# Patient Record
Sex: MALE | Race: WHITE | NOT HISPANIC OR LATINO | Employment: FULL TIME | URBAN - METROPOLITAN AREA
[De-identification: names, ages, dates, MRNs, and addresses within clinical notes are randomized per-mention and may not be internally consistent; named-entity substitution may affect disease eponyms.]

---

## 2017-01-05 ENCOUNTER — OFFICE VISIT - HEALTHEAST (OUTPATIENT)
Dept: FAMILY MEDICINE | Facility: CLINIC | Age: 57
End: 2017-01-05

## 2017-01-05 DIAGNOSIS — S46.012A STRAIN OF MUSCLE(S) AND TENDON(S) OF THE ROTATOR CUFF OF LEFT SHOULDER, INITIAL ENCOUNTER: ICD-10-CM

## 2017-01-05 DIAGNOSIS — I10 ESSENTIAL HYPERTENSION WITH GOAL BLOOD PRESSURE LESS THAN 130/80: ICD-10-CM

## 2017-01-05 DIAGNOSIS — R07.89 MUSCULOSKELETAL CHEST PAIN: ICD-10-CM

## 2017-05-29 ENCOUNTER — COMMUNICATION - HEALTHEAST (OUTPATIENT)
Dept: FAMILY MEDICINE | Facility: CLINIC | Age: 57
End: 2017-05-29

## 2017-05-29 DIAGNOSIS — F41.1 ANXIETY STATE: ICD-10-CM

## 2017-08-15 ENCOUNTER — COMMUNICATION - HEALTHEAST (OUTPATIENT)
Dept: FAMILY MEDICINE | Facility: CLINIC | Age: 57
End: 2017-08-15

## 2017-08-15 DIAGNOSIS — I10 ESSENTIAL HYPERTENSION WITH GOAL BLOOD PRESSURE LESS THAN 130/80: ICD-10-CM

## 2017-08-22 ENCOUNTER — OFFICE VISIT - HEALTHEAST (OUTPATIENT)
Dept: FAMILY MEDICINE | Facility: CLINIC | Age: 57
End: 2017-08-22

## 2017-08-22 DIAGNOSIS — R73.01 IMPAIRED FASTING GLUCOSE: ICD-10-CM

## 2017-08-22 DIAGNOSIS — I10 ESSENTIAL HYPERTENSION WITH GOAL BLOOD PRESSURE LESS THAN 130/80: ICD-10-CM

## 2017-08-22 DIAGNOSIS — E78.5 HYPERLIPIDEMIA: ICD-10-CM

## 2017-08-22 DIAGNOSIS — F41.1 ANXIETY STATE: ICD-10-CM

## 2017-08-22 DIAGNOSIS — M79.651 RIGHT THIGH PAIN: ICD-10-CM

## 2017-08-22 LAB
CHOLEST SERPL-MCNC: 227 MG/DL
FASTING STATUS PATIENT QL REPORTED: NO
HBA1C MFR BLD: 6.3 % (ref 3.5–6)
HDLC SERPL-MCNC: 32 MG/DL
LDLC SERPL CALC-MCNC: 112 MG/DL

## 2017-08-23 ENCOUNTER — COMMUNICATION - HEALTHEAST (OUTPATIENT)
Dept: FAMILY MEDICINE | Facility: CLINIC | Age: 57
End: 2017-08-23

## 2017-08-27 ENCOUNTER — COMMUNICATION - HEALTHEAST (OUTPATIENT)
Dept: FAMILY MEDICINE | Facility: CLINIC | Age: 57
End: 2017-08-27

## 2017-08-27 DIAGNOSIS — F41.1 ANXIETY STATE: ICD-10-CM

## 2017-09-27 ENCOUNTER — OFFICE VISIT - HEALTHEAST (OUTPATIENT)
Dept: FAMILY MEDICINE | Facility: CLINIC | Age: 57
End: 2017-09-27

## 2017-09-27 DIAGNOSIS — L91.8 MULTIPLE ACQUIRED SKIN TAGS: ICD-10-CM

## 2017-09-27 DIAGNOSIS — L98.9 SKIN LESION: ICD-10-CM

## 2017-09-27 DIAGNOSIS — D23.9 PAPILLOMA OF SKIN: ICD-10-CM

## 2018-01-09 ENCOUNTER — COMMUNICATION - HEALTHEAST (OUTPATIENT)
Dept: FAMILY MEDICINE | Facility: CLINIC | Age: 58
End: 2018-01-09

## 2018-01-09 DIAGNOSIS — F41.1 ANXIETY STATE: ICD-10-CM

## 2018-03-11 ENCOUNTER — COMMUNICATION - HEALTHEAST (OUTPATIENT)
Dept: FAMILY MEDICINE | Facility: CLINIC | Age: 58
End: 2018-03-11

## 2018-03-11 DIAGNOSIS — I10 ESSENTIAL HYPERTENSION WITH GOAL BLOOD PRESSURE LESS THAN 130/80: ICD-10-CM

## 2018-05-02 ENCOUNTER — COMMUNICATION - HEALTHEAST (OUTPATIENT)
Dept: FAMILY MEDICINE | Facility: CLINIC | Age: 58
End: 2018-05-02

## 2018-05-02 DIAGNOSIS — F41.1 ANXIETY STATE: ICD-10-CM

## 2018-06-16 ENCOUNTER — COMMUNICATION - HEALTHEAST (OUTPATIENT)
Dept: FAMILY MEDICINE | Facility: CLINIC | Age: 58
End: 2018-06-16

## 2018-06-16 DIAGNOSIS — F41.1 ANXIETY STATE: ICD-10-CM

## 2018-07-13 ENCOUNTER — COMMUNICATION - HEALTHEAST (OUTPATIENT)
Dept: FAMILY MEDICINE | Facility: CLINIC | Age: 58
End: 2018-07-13

## 2018-07-13 DIAGNOSIS — I10 ESSENTIAL HYPERTENSION WITH GOAL BLOOD PRESSURE LESS THAN 130/80: ICD-10-CM

## 2018-08-06 ENCOUNTER — COMMUNICATION - HEALTHEAST (OUTPATIENT)
Dept: FAMILY MEDICINE | Facility: CLINIC | Age: 58
End: 2018-08-06

## 2018-08-06 DIAGNOSIS — F41.1 ANXIETY STATE: ICD-10-CM

## 2018-10-08 ENCOUNTER — COMMUNICATION - HEALTHEAST (OUTPATIENT)
Dept: FAMILY MEDICINE | Facility: CLINIC | Age: 58
End: 2018-10-08

## 2018-10-08 DIAGNOSIS — F41.1 ANXIETY STATE: ICD-10-CM

## 2018-10-23 ENCOUNTER — COMMUNICATION - HEALTHEAST (OUTPATIENT)
Dept: FAMILY MEDICINE | Facility: CLINIC | Age: 58
End: 2018-10-23

## 2018-10-23 DIAGNOSIS — F41.1 ANXIETY STATE: ICD-10-CM

## 2018-10-24 RX ORDER — LORAZEPAM 1 MG/1
TABLET ORAL
Qty: 30 TABLET | Refills: 0 | Status: SHIPPED | OUTPATIENT
Start: 2018-10-24 | End: 2022-05-31

## 2018-11-23 ENCOUNTER — OFFICE VISIT - HEALTHEAST (OUTPATIENT)
Dept: FAMILY MEDICINE | Facility: CLINIC | Age: 58
End: 2018-11-23

## 2018-11-23 ENCOUNTER — COMMUNICATION - HEALTHEAST (OUTPATIENT)
Dept: TELEHEALTH | Facility: CLINIC | Age: 58
End: 2018-11-23

## 2018-11-23 DIAGNOSIS — I10 ESSENTIAL HYPERTENSION: ICD-10-CM

## 2018-11-23 DIAGNOSIS — F41.1 ANXIETY STATE: ICD-10-CM

## 2018-11-23 DIAGNOSIS — K21.9 GASTROESOPHAGEAL REFLUX DISEASE, ESOPHAGITIS PRESENCE NOT SPECIFIED: ICD-10-CM

## 2018-11-23 DIAGNOSIS — Z00.00 ENCOUNTER FOR ANNUAL GENERAL MEDICAL EXAMINATION WITHOUT ABNORMAL FINDINGS IN ADULT: ICD-10-CM

## 2018-11-23 DIAGNOSIS — E66.811 CLASS 1 OBESITY DUE TO EXCESS CALORIES WITH BODY MASS INDEX (BMI) OF 34.0 TO 34.9 IN ADULT, UNSPECIFIED WHETHER SERIOUS COMORBIDITY PRESENT: ICD-10-CM

## 2018-11-23 DIAGNOSIS — R73.01 IMPAIRED FASTING GLUCOSE: ICD-10-CM

## 2018-11-23 DIAGNOSIS — E66.09 CLASS 1 OBESITY DUE TO EXCESS CALORIES WITH BODY MASS INDEX (BMI) OF 34.0 TO 34.9 IN ADULT, UNSPECIFIED WHETHER SERIOUS COMORBIDITY PRESENT: ICD-10-CM

## 2018-11-23 DIAGNOSIS — E78.2 MIXED HYPERLIPIDEMIA: ICD-10-CM

## 2018-11-23 LAB
ALBUMIN SERPL-MCNC: 4 G/DL (ref 3.5–5)
ALP SERPL-CCNC: 52 U/L (ref 45–120)
ALT SERPL W P-5'-P-CCNC: 27 U/L (ref 0–45)
ANION GAP SERPL CALCULATED.3IONS-SCNC: 10 MMOL/L (ref 5–18)
AST SERPL W P-5'-P-CCNC: 24 U/L (ref 0–40)
BILIRUB SERPL-MCNC: 1 MG/DL (ref 0–1)
BUN SERPL-MCNC: 20 MG/DL (ref 8–22)
CALCIUM SERPL-MCNC: 10.2 MG/DL (ref 8.5–10.5)
CHLORIDE BLD-SCNC: 109 MMOL/L (ref 98–107)
CHOLEST SERPL-MCNC: 248 MG/DL
CO2 SERPL-SCNC: 20 MMOL/L (ref 22–31)
CREAT SERPL-MCNC: 0.89 MG/DL (ref 0.7–1.3)
FASTING STATUS PATIENT QL REPORTED: YES
GFR SERPL CREATININE-BSD FRML MDRD: >60 ML/MIN/1.73M2
GLUCOSE BLD-MCNC: 117 MG/DL (ref 70–125)
HBA1C MFR BLD: 6.2 % (ref 3.5–6)
HDLC SERPL-MCNC: 39 MG/DL
LDLC SERPL CALC-MCNC: 157 MG/DL
POTASSIUM BLD-SCNC: 4.7 MMOL/L (ref 3.5–5)
PROT SERPL-MCNC: 7.1 G/DL (ref 6–8)
SODIUM SERPL-SCNC: 139 MMOL/L (ref 136–145)
TRIGL SERPL-MCNC: 260 MG/DL

## 2018-11-23 ASSESSMENT — MIFFLIN-ST. JEOR: SCORE: 1996.05

## 2018-11-27 ENCOUNTER — COMMUNICATION - HEALTHEAST (OUTPATIENT)
Dept: FAMILY MEDICINE | Facility: CLINIC | Age: 58
End: 2018-11-27

## 2018-12-05 ENCOUNTER — COMMUNICATION - HEALTHEAST (OUTPATIENT)
Dept: FAMILY MEDICINE | Facility: CLINIC | Age: 58
End: 2018-12-05

## 2018-12-05 ENCOUNTER — COMMUNICATION - HEALTHEAST (OUTPATIENT)
Dept: SCHEDULING | Facility: CLINIC | Age: 58
End: 2018-12-05

## 2019-01-10 ENCOUNTER — COMMUNICATION - HEALTHEAST (OUTPATIENT)
Dept: FAMILY MEDICINE | Facility: CLINIC | Age: 59
End: 2019-01-10

## 2019-01-10 DIAGNOSIS — F41.1 ANXIETY STATE: ICD-10-CM

## 2019-03-18 ENCOUNTER — COMMUNICATION - HEALTHEAST (OUTPATIENT)
Dept: FAMILY MEDICINE | Facility: CLINIC | Age: 59
End: 2019-03-18

## 2019-03-18 DIAGNOSIS — I10 ESSENTIAL HYPERTENSION: ICD-10-CM

## 2019-04-05 ENCOUNTER — RECORDS - HEALTHEAST (OUTPATIENT)
Dept: ADMINISTRATIVE | Facility: OTHER | Age: 59
End: 2019-04-05

## 2019-07-14 ENCOUNTER — RECORDS - HEALTHEAST (OUTPATIENT)
Dept: ADMINISTRATIVE | Facility: OTHER | Age: 59
End: 2019-07-14

## 2019-07-17 ENCOUNTER — COMMUNICATION - HEALTHEAST (OUTPATIENT)
Dept: FAMILY MEDICINE | Facility: CLINIC | Age: 59
End: 2019-07-17

## 2019-07-17 DIAGNOSIS — I10 ESSENTIAL HYPERTENSION: ICD-10-CM

## 2019-11-25 ENCOUNTER — COMMUNICATION - HEALTHEAST (OUTPATIENT)
Dept: FAMILY MEDICINE | Facility: CLINIC | Age: 59
End: 2019-11-25

## 2019-11-25 DIAGNOSIS — F41.1 ANXIETY STATE: ICD-10-CM

## 2019-12-02 ENCOUNTER — COMMUNICATION - HEALTHEAST (OUTPATIENT)
Dept: FAMILY MEDICINE | Facility: CLINIC | Age: 59
End: 2019-12-02

## 2019-12-02 DIAGNOSIS — I10 ESSENTIAL HYPERTENSION: ICD-10-CM

## 2019-12-04 ENCOUNTER — COMMUNICATION - HEALTHEAST (OUTPATIENT)
Dept: FAMILY MEDICINE | Facility: CLINIC | Age: 59
End: 2019-12-04

## 2019-12-04 DIAGNOSIS — E78.2 MIXED HYPERLIPIDEMIA: ICD-10-CM

## 2020-01-09 ENCOUNTER — COMMUNICATION - HEALTHEAST (OUTPATIENT)
Dept: FAMILY MEDICINE | Facility: CLINIC | Age: 60
End: 2020-01-09

## 2020-01-09 DIAGNOSIS — F41.1 ANXIETY STATE: ICD-10-CM

## 2020-04-13 ENCOUNTER — COMMUNICATION - HEALTHEAST (OUTPATIENT)
Dept: FAMILY MEDICINE | Facility: CLINIC | Age: 60
End: 2020-04-13

## 2020-04-13 DIAGNOSIS — F41.1 ANXIETY STATE: ICD-10-CM

## 2020-04-14 ENCOUNTER — OFFICE VISIT - HEALTHEAST (OUTPATIENT)
Dept: FAMILY MEDICINE | Facility: CLINIC | Age: 60
End: 2020-04-14

## 2020-04-14 DIAGNOSIS — E66.811 CLASS 1 OBESITY DUE TO EXCESS CALORIES WITH BODY MASS INDEX (BMI) OF 34.0 TO 34.9 IN ADULT, UNSPECIFIED WHETHER SERIOUS COMORBIDITY PRESENT: ICD-10-CM

## 2020-04-14 DIAGNOSIS — R73.01 IMPAIRED FASTING GLUCOSE: ICD-10-CM

## 2020-04-14 DIAGNOSIS — K21.9 GASTROESOPHAGEAL REFLUX DISEASE, ESOPHAGITIS PRESENCE NOT SPECIFIED: ICD-10-CM

## 2020-04-14 DIAGNOSIS — E78.2 MIXED HYPERLIPIDEMIA: ICD-10-CM

## 2020-04-14 DIAGNOSIS — I10 ESSENTIAL HYPERTENSION: ICD-10-CM

## 2020-04-14 DIAGNOSIS — E66.09 CLASS 1 OBESITY DUE TO EXCESS CALORIES WITH BODY MASS INDEX (BMI) OF 34.0 TO 34.9 IN ADULT, UNSPECIFIED WHETHER SERIOUS COMORBIDITY PRESENT: ICD-10-CM

## 2020-04-14 DIAGNOSIS — T78.40XA ALLERGIC STATE, INITIAL ENCOUNTER: ICD-10-CM

## 2020-04-14 DIAGNOSIS — F41.1 ANXIETY STATE: ICD-10-CM

## 2020-04-14 ASSESSMENT — ANXIETY QUESTIONNAIRES
4. TROUBLE RELAXING: MORE THAN HALF THE DAYS
2. NOT BEING ABLE TO STOP OR CONTROL WORRYING: SEVERAL DAYS
1. FEELING NERVOUS, ANXIOUS, OR ON EDGE: MORE THAN HALF THE DAYS
3. WORRYING TOO MUCH ABOUT DIFFERENT THINGS: MORE THAN HALF THE DAYS
IF YOU CHECKED OFF ANY PROBLEMS ON THIS QUESTIONNAIRE, HOW DIFFICULT HAVE THESE PROBLEMS MADE IT FOR YOU TO DO YOUR WORK, TAKE CARE OF THINGS AT HOME, OR GET ALONG WITH OTHER PEOPLE: SOMEWHAT DIFFICULT
5. BEING SO RESTLESS THAT IT IS HARD TO SIT STILL: NOT AT ALL
GAD7 TOTAL SCORE: 10
6. BECOMING EASILY ANNOYED OR IRRITABLE: MORE THAN HALF THE DAYS
7. FEELING AFRAID AS IF SOMETHING AWFUL MIGHT HAPPEN: SEVERAL DAYS

## 2020-04-14 ASSESSMENT — PATIENT HEALTH QUESTIONNAIRE - PHQ9: SUM OF ALL RESPONSES TO PHQ QUESTIONS 1-9: 6

## 2020-04-22 ENCOUNTER — COMMUNICATION - HEALTHEAST (OUTPATIENT)
Dept: SCHEDULING | Facility: CLINIC | Age: 60
End: 2020-04-22

## 2020-10-10 ENCOUNTER — COMMUNICATION - HEALTHEAST (OUTPATIENT)
Dept: FAMILY MEDICINE | Facility: CLINIC | Age: 60
End: 2020-10-10

## 2020-10-10 DIAGNOSIS — I10 ESSENTIAL HYPERTENSION: ICD-10-CM

## 2021-01-07 ENCOUNTER — OFFICE VISIT - HEALTHEAST (OUTPATIENT)
Dept: FAMILY MEDICINE | Facility: CLINIC | Age: 61
End: 2021-01-07

## 2021-01-07 DIAGNOSIS — S06.0X0D CONCUSSION WITHOUT LOSS OF CONSCIOUSNESS, SUBSEQUENT ENCOUNTER: ICD-10-CM

## 2021-01-07 DIAGNOSIS — R51.9 NONINTRACTABLE HEADACHE, UNSPECIFIED CHRONICITY PATTERN, UNSPECIFIED HEADACHE TYPE: ICD-10-CM

## 2021-01-07 DIAGNOSIS — G47.00 INSOMNIA, UNSPECIFIED TYPE: ICD-10-CM

## 2021-04-12 ENCOUNTER — COMMUNICATION - HEALTHEAST (OUTPATIENT)
Dept: FAMILY MEDICINE | Facility: CLINIC | Age: 61
End: 2021-04-12

## 2021-04-12 DIAGNOSIS — F41.1 ANXIETY STATE: ICD-10-CM

## 2021-04-12 DIAGNOSIS — I10 ESSENTIAL HYPERTENSION: ICD-10-CM

## 2021-04-13 RX ORDER — PAROXETINE 20 MG/1
TABLET, FILM COATED ORAL
Qty: 270 TABLET | Refills: 3 | Status: SHIPPED | OUTPATIENT
Start: 2021-04-13 | End: 2022-05-31

## 2021-04-13 RX ORDER — LISINOPRIL 10 MG/1
TABLET ORAL
Qty: 90 TABLET | Refills: 1 | Status: SHIPPED | OUTPATIENT
Start: 2021-04-13 | End: 2021-11-14

## 2021-05-26 ASSESSMENT — PATIENT HEALTH QUESTIONNAIRE - PHQ9: SUM OF ALL RESPONSES TO PHQ QUESTIONS 1-9: 6

## 2021-05-28 ASSESSMENT — ANXIETY QUESTIONNAIRES: GAD7 TOTAL SCORE: 10

## 2021-05-30 VITALS — WEIGHT: 250 LBS | BODY MASS INDEX: 33.67 KG/M2

## 2021-05-30 NOTE — TELEPHONE ENCOUNTER
Refill Approved    Rx renewed per Medication Renewal Policy. Medication was last renewed on 3/18/19.    Joelle Wood, [facility] Triage/Med Refill 7/17/2019     Requested Prescriptions   Pending Prescriptions Disp Refills     lisinopril (PRINIVIL,ZESTRIL) 10 MG tablet [Pharmacy Med Name: LISINOPRIL 10MG TABLETS] 90 tablet 0     Sig: TAKE 1 TABLET(10 MG) BY MOUTH DAILY AS DIRECTED       Ace Inhibitors Refill Protocol Passed - 7/17/2019  4:27 PM        Passed - PCP or prescribing provider visit in past 12 months       Last office visit with prescriber/PCP: 9/27/2017 Oneil Stacy MD OR same dept: Visit date not found OR same specialty: 9/27/2017 Oneil Stacy MD  Last physical: 11/4/2015 Last MTM visit: Visit date not found   Next visit within 3 mo: Visit date not found  Next physical within 3 mo: Visit date not found  Prescriber OR PCP: Oneil Stacy MD  Last diagnosis associated with med order: 1. Essential hypertension  - lisinopril (PRINIVIL,ZESTRIL) 10 MG tablet [Pharmacy Med Name: LISINOPRIL 10MG TABLETS]; TAKE 1 TABLET(10 MG) BY MOUTH DAILY AS DIRECTED  Dispense: 90 tablet; Refill: 0    If protocol passes may refill for 12 months if within 3 months of last provider visit (or a total of 15 months).             Passed - Serum Potassium in past 12 months     Lab Results   Component Value Date    Potassium 4.7 11/23/2018             Passed - Blood pressure filed in past 12 months     BP Readings from Last 1 Encounters:   11/23/18 122/80             Passed - Serum Creatinine in past 12 months     Creatinine   Date Value Ref Range Status   11/23/2018 0.89 0.70 - 1.30 mg/dL Final

## 2021-05-31 VITALS — WEIGHT: 244 LBS | BODY MASS INDEX: 32.86 KG/M2

## 2021-05-31 VITALS — WEIGHT: 242 LBS | BODY MASS INDEX: 32.59 KG/M2

## 2021-06-02 VITALS — HEIGHT: 72 IN | WEIGHT: 253.1 LBS | BODY MASS INDEX: 34.28 KG/M2

## 2021-06-03 NOTE — TELEPHONE ENCOUNTER
Refill NOT Given  Refill NOT Given> 15 months since last office visit  Refill given per Policy, patient informed they are overdue for Labs and Office Visit   OV 9/27/17 & 11/23/18    Tina Gilliland, [facility] Connection Triage/Med Refill 12/2/2019    Requested Prescriptions   Pending Prescriptions Disp Refills     lisinopril (PRINIVIL,ZESTRIL) 10 MG tablet [Pharmacy Med Name: LISINOPRIL 10MG TABLETS] 90 tablet 0     Sig: TAKE 1 TABLET(10 MG) BY MOUTH DAILY AS DIRECTED       Ace Inhibitors Refill Protocol Failed - 12/2/2019  9:44 AM        Failed - PCP or prescribing provider visit in past 12 months       Last office visit with prescriber/PCP: 9/27/2017 Oneil Stacy MD OR same dept: Visit date not found OR same specialty: 9/27/2017 Oneil Stacy MD  Last physical: 11/4/2015 Last MTM visit: Visit date not found   Next visit within 3 mo: Visit date not found  Next physical within 3 mo: Visit date not found  Prescriber OR PCP: Oneil Stacy MD  Last diagnosis associated with med order: 1. Essential hypertension  - lisinopril (PRINIVIL,ZESTRIL) 10 MG tablet [Pharmacy Med Name: LISINOPRIL 10MG TABLETS]; TAKE 1 TABLET(10 MG) BY MOUTH DAILY AS DIRECTED  Dispense: 90 tablet; Refill: 0    If protocol passes may refill for 12 months if within 3 months of last provider visit (or a total of 15 months).             Failed - Serum Potassium in past 12 months     No results found for: LN-POTASSIUM          Failed - Blood pressure filed in past 12 months     BP Readings from Last 1 Encounters:   11/23/18 122/80             Failed - Serum Creatinine in past 12 months     Creatinine   Date Value Ref Range Status   11/23/2018 0.89 0.70 - 1.30 mg/dL Final

## 2021-06-03 NOTE — TELEPHONE ENCOUNTER
RN cannot approve Refill Request    RN can NOT refill this medication PCP messaged that patient is overdue for Office Visit. Last office visit: 9/27/2017 Oneil Stacy MD Last Physical: 11/4/2015 Last MTM visit: Visit date not found Last visit same specialty: 9/27/2017 Oneil Stacy MD.  Next visit within 3 mo: Visit date not found  Next physical within 3 mo: Visit date not found      Fanny Mondragon, [facility] Triage/Med Refill 11/26/2019    Requested Prescriptions   Pending Prescriptions Disp Refills     PARoxetine (PAXIL) 20 MG tablet [Pharmacy Med Name: PAROXETINE 20MG TABLETS] 270 tablet 0     Sig: TAKE 3 TABLETS BY MOUTH EVERY DAY       [facility] Refill Protocol  Failed - 11/25/2019  8:36 AM        Failed - PCP or prescribing provider visit in last year     Last office visit with prescriber/PCP: 9/27/2017 Oneil Stacy MD OR same dept: Visit date not found OR same specialty: 9/27/2017 Oneil Stacy MD  Last physical: 11/4/2015 Last MTM visit: Visit date not found   Next visit within 3 mo: Visit date not found  Next physical within 3 mo: Visit date not found  Prescriber OR PCP: Oneil Stacy MD  Last diagnosis associated with med order: 1. Anxiety state  - PARoxetine (PAXIL) 20 MG tablet [Pharmacy Med Name: PAROXETINE 20MG TABLETS]; TAKE 3 TABLETS BY MOUTH EVERY DAY  Dispense: 270 tablet; Refill: 0    If protocol passes may refill for 12 months if within 3 months of last provider visit (or a total of 15 months).

## 2021-06-04 NOTE — TELEPHONE ENCOUNTER
RN cannot approve Refill Request    RN can NOT refill this medication Protocol failed and NO refill given.       Joelle Wood, [facility] Triage/Med Refill 12/5/2019    Requested Prescriptions   Pending Prescriptions Disp Refills     atorvastatin (LIPITOR) 20 MG tablet 30 tablet 11     Sig: Take 1 tablet (20 mg total) by mouth daily.       Statins Refill Protocol (Hmg CoA Reductase Inhibitors) Failed - 12/4/2019 11:32 PM        Failed - PCP or prescribing provider visit in past 12 months      Last office visit with prescriber/PCP: 9/27/2017 Oneil Stacy MD OR same dept: Visit date not found OR same specialty: 9/27/2017 Oneil Stacy MD  Last physical: 11/4/2015 Last MTM visit: Visit date not found   Next visit within 3 mo: Visit date not found  Next physical within 3 mo: Visit date not found  Prescriber OR PCP: Oneil Stacy MD  Last diagnosis associated with med order: 1. Mixed hyperlipidemia  - atorvastatin (LIPITOR) 20 MG tablet; Take 1 tablet (20 mg total) by mouth daily.  Dispense: 30 tablet; Refill: 11    If protocol passes may refill for 12 months if within 3 months of last provider visit (or a total of 15 months).

## 2021-06-05 VITALS
SYSTOLIC BLOOD PRESSURE: 130 MMHG | TEMPERATURE: 98.8 F | BODY MASS INDEX: 33.63 KG/M2 | DIASTOLIC BLOOD PRESSURE: 80 MMHG | OXYGEN SATURATION: 96 % | HEART RATE: 115 BPM | WEIGHT: 248 LBS

## 2021-06-05 NOTE — TELEPHONE ENCOUNTER
Refill Given  Refill NOT Given> 15 months since last office visit  Refill given per Policy, patient informed they are overdue for Office Visit   OV 9/27/17    Tina Gilliland, [facility] Connection Triage/Med Refill 1/10/2020    Requested Prescriptions   Pending Prescriptions Disp Refills     PARoxetine (PAXIL) 20 MG tablet [Pharmacy Med Name: PAROXETINE 20MG TABLETS] 90 tablet 0     Sig: TAKE 3 TABLETS BY MOUTH EVERY DAY       [facility] Refill Protocol  Failed - 1/9/2020  7:00 PM        Failed - PCP or prescribing provider visit in last year     Last office visit with prescriber/PCP: 9/27/2017 Oneil Stacy MD OR same dept: Visit date not found OR same specialty: 9/27/2017 Oneil Stacy MD  Last physical: Visit date not found Last MTM visit: Visit date not found   Next visit within 3 mo: Visit date not found  Next physical within 3 mo: Visit date not found  Prescriber OR PCP: Oneil Stacy MD  Last diagnosis associated with med order: 1. Anxiety state  - PARoxetine (PAXIL) 20 MG tablet [Pharmacy Med Name: PAROXETINE 20MG TABLETS]; TAKE 3 TABLETS BY MOUTH EVERY DAY  Dispense: 90 tablet; Refill: 0    If protocol passes may refill for 12 months if within 3 months of last provider visit (or a total of 15 months).

## 2021-06-07 NOTE — TELEPHONE ENCOUNTER
RN cannot approve Refill Request    RN can NOT refill this medication Protocol failed and NO refill given.      Joelle Wood, [facility] Triage/Med Refill 4/13/2020    Requested Prescriptions   Pending Prescriptions Disp Refills     PARoxetine (PAXIL) 20 MG tablet 270 tablet 3     Sig: Take 3 tablets (60 mg total) by mouth daily.       [facility] Refill Protocol  Failed - 4/13/2020  9:30 AM        Failed - PCP or prescribing provider visit in last year     Last office visit with prescriber/PCP: 9/27/2017 Oneil Stacy MD OR same dept: Visit date not found OR same specialty: 9/27/2017 Oneil Stacy MD  Last physical: Visit date not found Last MTM visit: Visit date not found   Next visit within 3 mo: Visit date not found  Next physical within 3 mo: Visit date not found  Prescriber OR PCP: Oneil Stacy MD  Last diagnosis associated with med order: 1. Anxiety  - PARoxetine (PAXIL) 20 MG tablet; Take 3 tablets (60 mg total) by mouth daily.  Dispense: 270 tablet; Refill: 0    If protocol passes may refill for 12 months if within 3 months of last provider visit (or a total of 15 months).

## 2021-06-07 NOTE — TELEPHONE ENCOUNTER
"RN Triage:   Drainage from sinuses into the throat.   Sore throat and cough for a few weeks.  He has an earache in the right ear which has gotten better. He is having a lot of mucus draining that is grey yellow and thick. NO teeth or facial pain.   He stated he has an \"an over 50 hay fever\". He is taking a mucus relief DM with guaifenesin. He is not taking any claritin or any anti allergy medication. He is working outside the home.   No fever  Cough from irritation from post nasal drip and coughs every hour 1-2 times.   No headache  He takes \"a little red pill daily for a cold\" he did not know the name of the pill.     Advised an appointment to talk about his symptoms. Advised patient if he is ill he should not be at work.   Patient was offered to have a televisit and declined.     COVID 19 Nurse Triage Plan/Patient Instructions    Please be aware that novel coronavirus (COVID-19) may be circulating in the community. If you develop symptoms such as fever, cough, or SOB or if you have concerns about the presence of another infection including coronavirus (COVID-19), please contact your health care provider or visit [email or web address].     Disposition/Instructions    Additional COVID19 information to add for patients.     Additional General Information About COVID-19    COVID-19 - General Information:  Regardless of if you have been tested or not:  Patient who have symptoms (cough, fever, or shortness of breath), need to isolate for 7 days from when symptoms started AND 72 hours after fever resolves (without fever reducing medications) AND improvement of respiratory symptoms (whichever is longer).      Isolate yourself at home (in own room/own bathroom if possible)    Do Not allow any visitors    Do Not go to work or school    Do Not go to [facility],  centers, shopping, or other public places.    Do Not shake hands.    Avoid close and intimate contact with others (hugging, kissing).    Follow CDC recommendations " for household cleaning of frequently touched services.     After the initial 7 days, continue to isolate yourself from household members as much as possible. To continue decrease the risk of community spread and exposure, you and any members of your household should limit activities in public for 14 days after starting home isolation.     You can reference the following CDC link for helpful home isolation/care tips:  https://www.cdc.gov/coronavirus/2019-ncov/downloads/10Things.pdf    COVID-19 - Symptoms:     The COVID-19 can cause a respiratory illness, such as bronchitis or pneumonia.    The most common symptoms are: cough, fever, and shortness of breath.     Other symptoms are: body aches, chills, diarrhea, fatigue, headache, runny nose, and sore throat     COVID-19 - Exposure Risk Factors:    Exposure to a person who has been diagnosed with COVID-19 .    Travel from an area with recent local transmission of COVID-19 .    The [facility] (www.cdc.gov) has the most up-to-date list of where the COVID-19 outbreak is occurring.    COVID-19 - Spreading:     The virus likely spreads through respiratory droplets produced when a person coughs or sneezes. These respiratory droplets can travel approximately 6 feet and can remain on surfaces.  Common disinfectants will kill the virus.    The CDC currently does not recommend healthy people wearing masks.    COVID-19 - Protect Yourself:     Avoid close contact with people known to have this new COVID-19 infection.    Wash hands often with soap and water or alcohol-based hand .    Avoid touching the eyes, nose or mouth.       Thank you for limiting contact with others, wearing a simple mask to cover your cough, practice good hand hygiene habits and accessing our [employer] services where possible to limit the spread of this virus.    For more information about COVID19 and options for caring for yourself at home, please visit the CDC website at  https://www.cdc.gov/coronavirus/2019-ncov/about/steps-when-sick.html  For more options for care at [facility], please visit our website at [email or web address]    For more information, please use the [place] Department of Health COVID-19 Website: [email or web address]/coronavirus/[email or web address].html  [place] Department of Health ([facility]) COVID-19 Hotlines (Interpreters available):      Health questions: Phone Number: [phone] or [phone] and Hours: 7 a.m. to 7 p.m.    Schools and  questions: Phone Number: [phone] or [phone] and Hours 7 a.m. to 7 p.m.                        Reason for Disposition    Earache    Sinus congestion (pressure, fullness) present > 10 days    Nasal discharge present > 10 days    Protocols used: SINUS PAIN AND CONGESTION-A-OH

## 2021-06-07 NOTE — PROGRESS NOTES
"Oneil Fuller is a 60 y.o. male who is being evaluated via a billable telephone visit.      The patient has been notified of following:     \"This telephone visit will be conducted via a call between you and your physician/provider. We have found that certain health care needs can be provided without the need for a physical exam.  This service lets us provide the care you need with a short phone conversation.  If a prescription is necessary we can send it directly to your pharmacy.  If lab work is needed we can place an order for that and you can then stop by our lab to have the test done at a later time.    Telephone visits are billed at different rates depending on your insurance coverage. During this emergency period, for some insurers they may be billed the same as an in-person visit.  Please reach out to your insurance provider with any questions.    If during the course of the call the physician/provider feels a telephone visit is not appropriate, you will not be charged for this service.\"    Patient has given verbal consent to a Telephone visit? Yes    Patient would like to receive their AVS by AVS Preference: Gregor.     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, orchectomy, yearssecond surgery, CXR q 5 years for monitoring (last performed 14); eye muscle surgery due to strabismus; right forehead cyst excised 2010; wisdom teeth; tonsils age 5   Hospitalizations: as above  Mom -  79 due to diabetes > 30 years with bilateral lower extremity amputation; hemodialysis x 6 years   Dad - arthritis; 3 knee replacements   1 bro - arthritis and \"heart issues\"   2 sis -   Work: unemployeed currently - h/o  ( working for [employer] in this building); 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  " Needs med refills.  Is been out of meds for significant period of time.  Had requested refill on paroxetine 60 mg daily.  Virtual visit was completed today.  Has not required lorazepam.  Would like to go back on paroxetine which has been beneficial for anxiety management.  Uses OTC omeprazole 20 mg daily.  Wants to remain off statin therapy with prior atorvastatin 20 mg daily utilized.  Impaired fasting glucose history.  Has gained at least 10 pounds perhaps with prior BMI of 32.86 with a weight of 244 pounds at that visit.  Perhaps is lost some weight recently.  Increased life stressors having multiple family members living at home at this time.  Describes hayfever type symptoms including sneezing and coughing which are ongoing.  Question mold allergy.  No fevers.  No shortness of breath.  Denies asthma history.  Comprehensive review of systems as above otherwise all negative.        Assessment/Plan:    1. Essential hypertension  Essential hypertension with lisinopril 10 mg daily previously however has not used in months.  Not checking blood pressures on a consistent basis.  Did refill lisinopril 10 mg daily and will reassess at fasting physical exam within next 3 months in office.  - lisinopriL (PRINIVIL,ZESTRIL) 10 MG tablet; TAKE 1 TABLET(10 MG) BY MOUTH DAILY  Dispense: 90 tablet; Refill: 1    2. Mixed hyperlipidemia  Patient has been on atorvastatin 20 mg daily in the past however elects to remain off this medication at this time.  Did discuss dietary and exercise modification for weight goal less than 230 pounds initially, less than 220 pounds ideally.    3. Impaired fasting glucose  Prior A1c of 6.2% November 23, 2018 and will reassess for diabetes risk at physical exam within next 3 months.    4. Gastroesophageal reflux disease, esophagitis presence not specified  Receives omeprazole 20 mg daily OTC with benefits otherwise recurrent concerns if not taking medication.  No breakthrough symptoms  "otherwise.    5. Anxiety  Patient elects resumption of paroxetine 60 mg daily.  Has multiple family members living in his house currently.  ELIOT 7 questionnaire 10 out of 21 with PHQ 9 questionnaire 6 out of 27.    6. Class 1 obesity due to excess calories with body mass index (BMI) of 34.0 to 34.9 in adult, unspecified whether serious comorbidity present  Dietary and exercise modifications as noted for weight goal less than 230 pounds initially, less than 220 pounds ideally.    7. Allergic state, initial encounter  Describes allergy type symptoms as \"after 51 hayfever\" with sneezing and coughing question mold allergy and will complete [place] allergy panel at physical exam.        Phone call duration:  14 minutes    Oneil Stacy MD  "
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
## 2021-06-12 ENCOUNTER — HEALTH MAINTENANCE LETTER (OUTPATIENT)
Age: 61
End: 2021-06-12

## 2021-06-12 NOTE — TELEPHONE ENCOUNTER
RN cannot approve Refill Request    RN can NOT refill this medication Protocol failed and NO refill given. Last office visit: 9/27/2017 Oneil Stacy MD Last Physical: Visit date not found Last MTM visit: Visit date not found Last visit same specialty: 9/27/2017 Oneil Stacy MD.  Next visit within 3 mo: Visit date not found  Next physical within 3 mo: Visit date not found      Joelle Wood, [facility] Triage/Med Refill 10/10/2020    Requested Prescriptions   Pending Prescriptions Disp Refills     lisinopriL (PRINIVIL,ZESTRIL) 10 MG tablet 90 tablet 1     Sig: TAKE 1 TABLET(10 MG) BY MOUTH DAILY       Ace Inhibitors Refill Protocol Failed - 10/10/2020  8:42 AM        Failed - Serum Potassium in past 12 months     No results found for: LN-POTASSIUM          Failed - Blood pressure filed in past 12 months     BP Readings from Last 1 Encounters:   11/23/18 122/80             Failed - Serum Creatinine in past 12 months     Creatinine   Date Value Ref Range Status   11/23/2018 0.89 0.70 - 1.30 mg/dL Final             Passed - PCP or prescribing provider visit in past 12 months       Last office visit with prescriber/PCP: 9/27/2017 Oneil Stacy MD OR same dept: Visit date not found OR same specialty: 9/27/2017 Oneil Stacy MD  Last physical: Visit date not found Last MTM visit: Visit date not found   Next visit within 3 mo: Visit date not found  Next physical within 3 mo: Visit date not found  Prescriber OR PCP: Oneil Stacy MD  Last diagnosis associated with med order: 1. Essential hypertension  - lisinopriL (PRINIVIL,ZESTRIL) 10 MG tablet; TAKE 1 TABLET(10 MG) BY MOUTH DAILY  Dispense: 90 tablet; Refill: 1    If protocol passes may refill for 12 months if within 3 months of last provider visit (or a total of 15 months).

## 2021-06-12 NOTE — PROGRESS NOTES
Assessment:    1. Essential hypertension with goal blood pressure less than 130/80  Basic Metabolic Panel   2. Anxiety state  LORazepam (ATIVAN) 1 MG tablet    PARoxetine (PAXIL) 20 MG tablet   3. Impaired fasting glucose  Glycosylated Hemoglobin A1c    Basic Metabolic Panel   4. Right thigh pain     5. Hyperlipidemia  HDL Cholesterol    Cholesterol, Total    LDL Cholesterol, Direct         Plan:    Discussed findings of hypertension at goal with lisinopril 10 mg daily.  Check basic metabolic panel today, nonfasting.  Did provide refill on Lorazepam and paroxetine for anxiety management.  Check A1c regarding impaired fasting glucose findings with prior A1c is high at 6.6% June 22, 2016.  Continued attempts at weight goal less than 230 pounds initially, less than 220 pounds ideally with scheduled skin tag excision, multiple, September 27, 2017 as well as need for fasting physical exam in next 3-6 months.  Will check total, HDL and LDL cholesterol levels with history of hyperlipidemia.  No longer using gabapentin due to ineffectiveness for right thigh paresthesias.        Subjective:    Oneil Fuller is seen today for follow-up evaluation.  Hypertension.  Is on lisinopril 10 mg daily.  History of significant hyperlipidemia however nonfasting today.  Prior impaired fasting glucose however most recent glycosylated hemoglobin of 6.6% June 22, 2016.  Family history reviewed with mother with diabetes greater than 30 years requiring lower extremity amputation hemodialysis etc.  Patient denies increased thirst, urination or significant weight fluctuations.  Right thigh paresthesias, stable, tolerable without prior benefit from gabapentin use noted.  History of significant hyperlipidemia with most recent lipid cascade November 4, 2015 with total cholesterol 270, triglycerides 336, HDL 38 and .  Denies chest pain.  Has multiple skin tags likely greater than 10 base of neck, axilla, right posterior chest, groin as  well as gluteal regions.  Comprehensive review of systems as above otherwise all negative.    Past Surgical History:   Procedure Laterality Date     orchectomy       TONSILLECTOMY          Family History   Problem Relation Age of Onset     Diabetes Mother      Arthritis Father      Arthritis Brother         Past Medical History:   Diagnosis Date     Anxiety      GERD (gastroesophageal reflux disease)      Hypertension         Social History   Substance Use Topics     Smoking status: Never Smoker     Smokeless tobacco: None     Alcohol use Yes        Current Outpatient Prescriptions   Medication Sig Dispense Refill     aspirin-sod bicarb-citric acid (TRISHA-SELTZER) 324 mg TbEF Take 325 mg by mouth every 6 (six) hours as needed (takes once a day).       lisinopril (PRINIVIL,ZESTRIL) 10 MG tablet TAKE ONE TABLET BY MOUTH EVERY DAY AS DIRECTED 90 tablet 1     LORazepam (ATIVAN) 1 MG tablet TAKE 1/2 TO 1 TABLET BY MOUTH EVERY 8 HOURS AS NEEDED FOR ANXIETY 30 tablet 2     multivitamin therapeutic (THERAGRAN) tablet Take 1 tablet by mouth daily.       omega-3 fatty acids-vitamin E 1,000 mg cap Take by mouth.       omeprazole (PRILOSEC) 20 MG capsule Take 20 mg by mouth daily.       PARoxetine (PAXIL) 20 MG tablet TAKE 3 TABLETS BY MOUTH EVERY  tablet 3     No current facility-administered medications for this visit.           Objective:    Vitals:    08/22/17 1520   BP: 100/60   Pulse: 64   Weight: (!) 242 lb (109.8 kg)      Body mass index is 32.59 kg/(m^2).    Alert.  No apparent distress.  Quiet affect.  Baseline.  Skin exam does show multiple skin tags involving base of neck, bilateral axilla, groin, gluteal region and right posterior shoulder/chest.  Chest clear.  Cardiac exam regular.  Blood pressure 100/60.  Obesity noted with BMI 32.6.

## 2021-06-13 NOTE — PROGRESS NOTES
"Assessment:    1. Multiple acquired skin tags     2. Skin lesion     3. Papilloma of skin           Plan:    Patient seen for skin tag removal, multiple as well as larger papilloma posterior chest as well as right medial thigh.  Also has skin lesion at base of neck that appears to be a vascular nevus versus hemangioma.  Patient declines further intervention for this.  Skin tag excision ×20 completed involving bilateral axilla, bilateral groin and base of neck.  Tolerated well with good hemostasis achieved prior to the clinic discharge.  Patient will notify with further concerns.  Silver nitrate chemical cautery was performed for papilloma of skin excision ×2.        Subjective:    Oneil Fuller is seen today for multiple skin tags.  Base of neck, bilateral axilla and bilateral groin.  Mcfadden skin tag noted involving posterior chest as well as right medial thigh.  Irritation resulting from friction.  Patient will like complete excision of all lesions.  Has lesion at base of neck as well that wife is noticed.  Patient resistant to have removed at this time.  Does not cause any symptoms.     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, orchectomy, yearssecond surgery, CXR q 5 years for monitoring (last performed 14); eye muscle surgery due to strabismus; right forehead cyst excised 2010; wisdom teeth; tonsils age 5   Hospitalizations: as above  Mom -  79 due to diabetes > 30 years with bilateral lower extremity amputation; hemodialysis x 6 years   Dad - arthritis; 3 knee replacements   1 bro - arthritis and \"heart issues\"   2 sis -   Work: unemployeed currently - h/o  ( working for [employer] in this building); 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     Past Surgical History:   Procedure Laterality Date     orchectomy       TONSILLECTOMY          Family " History   Problem Relation Age of Onset     Diabetes Mother      Arthritis Father      Arthritis Brother         Past Medical History:   Diagnosis Date     Anxiety      GERD (gastroesophageal reflux disease)      Hypertension         Social History   Substance Use Topics     Smoking status: Never Smoker     Smokeless tobacco: None     Alcohol use Yes        Current Outpatient Prescriptions   Medication Sig Dispense Refill     aspirin-sod bicarb-citric acid (TRISHA-SELTZER) 324 mg TbEF Take 325 mg by mouth every 6 (six) hours as needed (takes once a day).       lisinopril (PRINIVIL,ZESTRIL) 10 MG tablet TAKE ONE TABLET BY MOUTH EVERY DAY AS DIRECTED 90 tablet 1     LORazepam (ATIVAN) 1 MG tablet TAKE 1/2 TO 1 TABLET BY MOUTH EVERY 8 HOURS AS NEEDED FOR ANXIETY 30 tablet 2     multivitamin therapeutic (THERAGRAN) tablet Take 1 tablet by mouth daily.       omega-3 fatty acids-vitamin E 1,000 mg cap Take by mouth.       omeprazole (PRILOSEC) 20 MG capsule Take 20 mg by mouth daily.       PARoxetine (PAXIL) 20 MG tablet TAKE 3 TABLETS BY MOUTH EVERY  tablet 3     PARoxetine (PAXIL) 20 MG tablet TAKE 3 TABLETS BY MOUTH EVERY  tablet 0     No current facility-administered medications for this visit.           Objective:    Vitals:    09/27/17 1642   BP: 120/80   Pulse: 72   Weight: (!) 244 lb (110.7 kg)      Body mass index is 32.86 kg/(m^2).    Multiple skin tags involving bilateral axilla, bilateral groin and base of neck noted.  These were removed in typical fashion with straight iris scissor after use of 1% lidocaine with epinephrine locally.  Good hemostasis achieved prior to discharge from clinic today.  Larger papilloma right posterior chest as well as right medial thigh also removed and a similar fashion and tolerated well.  Vascular lesion at base of neck upper posterior chest consistent with hemangioma.  Patient declines punch biopsy for definitive excision.

## 2021-06-14 NOTE — PROGRESS NOTES
Assessment/Plan:    1. Concussion without loss of consciousness, subsequent encounter  Closed head injury with postconcussion symptoms described.  Did review medical records through care everywhere from [facility] from Worker's Compensation physician and occupational therapist.  Due to worsening concerns with described headache, low tolerance to visual stimulation and difficulties completing complex tasks at home and at work, will allow patient to remain off work through Monday at which time he will follow-up with Worker's Compensation provider Dr. Mcmahan at 1:45 PM.    2. Nonintractable headache, unspecified chronicity pattern, unspecified headache type  Non-intractable headache currently.  Using Naprosyn 220 mg using 2 tablets every 12 hours.  Patient does see chiropractor on a weekly basis which helps with any cervical range of motion issues etc.    3. Insomnia, unspecified type  Patient was given muscle relaxant by Worker's Compensation provider which has helped with sleep onset which has been disrupted since head injury December 9, 2020.    30 minutes total time with patient, > 50% with counseling and coordination of cares.         Subjective:    Oneil Fuller is seen today for evaluation of closed head injury described December 9, 2020 while working for [employer] in a degreasing position.  Attempted to pull a tray out from the  which caused the hinge gait to come down on top of his head.  No loss of consciousness.  Did go to the ground and was found and was sent home from work.  Had been seen that day by provider for assessment and since then continues to follow with Dr. Mcmahan as well as occupational therapist regarding Worker's Compensation findings.  Using Naprosyn 220 mg using 2 tablets every 12 hours plus muscle relaxant recently prescribed by Dr. Mcmahan that has been helpful for sleep onset with difficulties with residual insomnia noted.  Did not have head scan.  Did miss his alarm  yesterday.  Increased stress resulting in symptomatic worsening with increasing work responsibilities again.  Had to cover responsibilities of  machine after another employee had family emergency leaving patient to cover this job.  Currently working 4 hours a day.  Is scheduled to follow-up with Worker's Compensation provider Monday, January 11, 2021 at 1:45 PM.    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)       Past Surgical History:   Procedure Laterality Date     orchectomy       TONSILLECTOMY          Family History   Problem Relation Age of Onset     Diabetes Mother      Arthritis Father      Arthritis Brother         Past Medical History:   Diagnosis Date     Anxiety      GERD (gastroesophageal reflux disease)      Hypertension         Social History     Tobacco Use     Smoking status: Never Smoker     Smokeless tobacco: Never Used   Substance Use Topics     Alcohol use: Yes     Drug use: No        Current Outpatient Medications   Medication Sig Dispense Refill     aspirin-sod bicarb-citric acid (TRISHA-SELTZER) 324 mg TbEF Take 325 mg by mouth every 6 (six) hours as needed (takes once a day).       lisinopriL (PRINIVIL,ZESTRIL) 10 MG tablet TAKE 1 TABLET(10 MG) BY MOUTH DAILY 90 tablet 1     LORazepam (ATIVAN) 1 MG tablet TAKE 1/2 TO 1 TABLET BY MOUTH EVERY 8 HRS AS NEEDED FOR ANXIETY 30 tablet 0     omega-3 fatty acids-vitamin E 1,000 mg cap Take by mouth.       omeprazole (PRILOSEC) 20 MG capsule Take 20 mg by mouth daily.       PARoxetine (PAXIL) 20 MG tablet Take 3 tablets (60 mg total) by mouth daily. 270 tablet 3     multivitamin therapeutic (THERAGRAN) tablet Take 1 tablet by mouth daily.       No current facility-administered medications for this visit.           Objective:    Vitals:    01/07/21 1304   BP: 130/80   Pulse: (!) 115   Temp: 98.8  F (37.1  C)   SpO2: 96%   Weight: (!) 248 lb (112.5 kg)      Body mass index is 33.63 kg/m .    Alert.  Quiet  affect.  Does appear to struggle with some thought process regarding recall however is able to navigate with time to complete information requested etc.  No significant psychomotor agitation.  HEENT exam.  To have pupils equal round reactive to light.  Cranial exam without abnormality.  Nose scalp injury etc.  Neck range of motion appears relatively intact.  Upper and lower extremity exam with neurologic exam symmetric with 1+ DTRs.  No rash.      This note has been dictated using voice recognition software and as a result may contain minor grammatical errors and unintended word substitutions.

## 2021-06-16 PROBLEM — Z00.00 ENCOUNTER FOR ANNUAL GENERAL MEDICAL EXAMINATION WITHOUT ABNORMAL FINDINGS IN ADULT: Status: ACTIVE | Noted: 2018-11-23

## 2021-06-16 NOTE — TELEPHONE ENCOUNTER
RN cannot approve Refill Request    RN can NOT refill this medication PCP messaged that patient is overdue for Labs. Last office visit: 1/7/2021 Oneil Stacy MD Last Physical: Visit date not found Last MTM visit: Visit date not found Last visit same specialty: 1/7/2021 Oneil Stacy MD.  Next visit within 3 mo: Visit date not found  Next physical within 3 mo: Visit date not found      Aysha Zapata, [facility] Triage/Med Refill 4/12/2021    Requested Prescriptions   Pending Prescriptions Disp Refills     PARoxetine (PAXIL) 20 MG tablet [Pharmacy Med Name: PAROXETINE 20MG TABLETS] 270 tablet 3     Sig: TAKE 3 TABLETS(60 MG) BY MOUTH DAILY       [facility] Refill Protocol  Passed - 4/12/2021  6:30 AM        Passed - PCP or prescribing provider visit in last year     Last office visit with prescriber/PCP: 1/7/2021 Oneil Stacy MD OR same dept: 1/7/2021 Oneil Stacy MD OR same specialty: 1/7/2021 Oneil Stacy MD  Last physical: Visit date not found Last MTM visit: Visit date not found   Next visit within 3 mo: Visit date not found  Next physical within 3 mo: Visit date not found  Prescriber OR PCP: Oneil Stacy MD  Last diagnosis associated with med order: 1. Anxiety  - PARoxetine (PAXIL) 20 MG tablet [Pharmacy Med Name: PAROXETINE 20MG TABLETS]; TAKE 3 TABLETS(60 MG) BY MOUTH DAILY  Dispense: 270 tablet; Refill: 3    2. Essential hypertension  - lisinopriL (PRINIVIL,ZESTRIL) 10 MG tablet [Pharmacy Med Name: LISINOPRIL 10MG TABLETS]; TAKE 1 TABLET(10 MG) BY MOUTH DAILY  Dispense: 90 tablet; Refill: 1    If protocol passes may refill for 12 months if within 3 months of last provider visit (or a total of 15 months).                lisinopriL (PRINIVIL,ZESTRIL) 10 MG tablet [Pharmacy Med Name: LISINOPRIL 10MG TABLETS] 90 tablet 1     Sig: TAKE 1 TABLET(10 MG) BY MOUTH DAILY       Ace Inhibitors Refill Protocol Failed - 4/12/2021  6:30 AM        Failed - Serum Potassium in past 12 months     No  results found for: LN-POTASSIUM          Failed - Serum Creatinine in past 12 months     Creatinine   Date Value Ref Range Status   11/23/2018 0.89 0.70 - 1.30 mg/dL Final             Passed - PCP or prescribing provider visit in past 12 months       Last office visit with prescriber/PCP: 1/7/2021 Oneil Stacy MD OR same dept: 1/7/2021 Oneil Stacy MD OR same specialty: 1/7/2021 Oneil Stacy MD  Last physical: Visit date not found Last MTM visit: Visit date not found   Next visit within 3 mo: Visit date not found  Next physical within 3 mo: Visit date not found  Prescriber OR PCP: Oneil Stacy MD  Last diagnosis associated with med order: 1. Anxiety  - PARoxetine (PAXIL) 20 MG tablet [Pharmacy Med Name: PAROXETINE 20MG TABLETS]; TAKE 3 TABLETS(60 MG) BY MOUTH DAILY  Dispense: 270 tablet; Refill: 3    2. Essential hypertension  - lisinopriL (PRINIVIL,ZESTRIL) 10 MG tablet [Pharmacy Med Name: LISINOPRIL 10MG TABLETS]; TAKE 1 TABLET(10 MG) BY MOUTH DAILY  Dispense: 90 tablet; Refill: 1    If protocol passes may refill for 12 months if within 3 months of last provider visit (or a total of 15 months).             Passed - Blood pressure filed in past 12 months     BP Readings from Last 1 Encounters:   01/07/21 130/80

## 2021-06-21 NOTE — LETTER
Letter by Oneil Stacy MD at      Author: Oneil Stacy MD Service: -- Author Type: --    Filed:  Encounter Date: 1/7/2021 Status: (Other)         January 7, 2021     Patient: Oneil Fuller   YOB: 1960   Date of Visit: 1/7/2021       To Whom It May Concern:    It is my medical opinion that Oneil Fuller should remain out of work due to symptom exacerbation due to resumption of work responsibilities.  He should remain out of work beginning today, 1/7/21, until after scheduled follow-up with Worker's Compensation provider on 1/11/21 at 1:45 PM.    If you have any questions or concerns, please don't hesitate to call.    Sincerely,        Electronically signed by Oneil Stacy MD

## 2021-06-21 NOTE — PROGRESS NOTES
Assessment/Plan:     1. Encounter for annual general medical examination without abnormal findings in adult  Annual physical exam completed today.  Exam is overall unremarkable.  Discussed routine healthcare maintenance and preventative measures.  Encouraged healthy diet and exercise for weight management.  Colonoscopy due in April 2019.  Patient declines flu vaccine today.  He is up-to-date on all other health maintenance.  Return to clinic in 1 year for annual exam.    2. Essential hypertension  Blood pressure remained stable on lisinopril 10 mg daily.  Will check CMP today.  - Comprehensive Metabolic Panel  - lisinopril (PRINIVIL,ZESTRIL) 10 MG tablet; Take 1 tablet (10 mg total) by mouth daily As directed.  Dispense: 90 tablet; Refill: 0    3. Mixed hyperlipidemia  History of hyper lipidemia.  Will check fasting lipids today.  Encouraged healthy diet and exercise.  - Lipid Cascade FASTING    4. Impaired fasting glucose  History of impaired fasting glucose.  Will check A1c today.  - Glycosylated Hemoglobin A1c    5. Class 1 obesity due to excess calories with body mass index (BMI) of 34.0 to 34.9 in adult, unspecified whether serious comorbidity present  Encouraged lifestyle modifications including diet and exercise modifications.  - Lipid Cascade FASTING    6. Gastroesophageal reflux disease, esophagitis presence not specified  Symptoms controlled with use of omeprazole.    7. Anxiety  Stable.  Continue with Paxil 60 mg daily and 1 mg of Ativan as needed every 8 hours for management of anxiety.  - PARoxetine (PAXIL) 20 MG tablet; Take 3 tablets (60 mg total) by mouth daily.  Dispense: 270 tablet; Refill: 0  - LORazepam (ATIVAN) 1 MG tablet; TAKE 1/2 TO 1 TABLET BY MOUTH EVERY 8 HRS AS NEEDED FOR ANXIETY.  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) - chemo x 18  "months, orchectomy, yearssecond surgery, CXR q 5 years for monitoring (last performed 14); eye muscle surgery due to strabismus; right forehead cyst excised 2010; wisdom teeth; tonsils age 5   Hospitalizations: as above  Mom -  79 due to diabetes > 30 years with bilateral lower extremity amputation; hemodialysis x 6 years   Dad - arthritis; 3 knee replacements   1 bro - arthritis and \"heart issues\"   2 sis -   Work: [employer] currently - h/o  ( working for [employer] in this building); 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 male who presents for an annual exam.  Patient reports that things have been going well over the last year.  Continues to take medications as prescribed for hypertension, anxiety, and GERD, along with a multivitamin and fish oil.  He would like refill of Paxil, Ativan and lisinopril today.  History of impaired fasting glucose with hemoglobin A1c of 6.3 in 2017.  Patient confirms family history of diabetes with his mom requiring lower extremity amputation and hemodialysis.  He denies any symptoms of increased thirst or urination.  History of hyperlipidemia, he would like to have cholesterol checked today.  He denies having any chest pain, shortness of breath.  No edema.  Last colonoscopy in 2014.  Due for follow-up colonoscopy in .  He declines influenza vaccine today.    Healthy Habits:   Regular Exercise: Yes  Healthy Diet: Yes  Dental Visits Regularly: Yes  Seat Belt: Yes   Sexually active: Yes  Colonoscopy: Due in 2019.  Lipid Profile: Yes  Glucose Screen: Yes     Past Medical History, Family History, and Social History reviewed.     - Cindy x 1985   3 adopted children   No smoke   EtOH: infrequent   Surgeries: embryonal carcinoma right testicle (1978) - chemo x 18 months, orchectomy, yearssecond surgery, CXR q 5 years for monitoring (last performed " "14); eye muscle surgery due to strabismus; right forehead cyst excised 2010; wisdom teeth; tonsils age 5   Hospitalizations: as above  Mom -  79 due to diabetes > 30 years with bilateral lower extremity amputation; hemodialysis x 6 years   Dad - arthritis; 3 knee replacements   1 bro - arthritis and \"heart issues\"   2 sis -   Work: [employer] currently - h/o  ( working for [employer] 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  Refuses flu vaccine today.    Current Outpatient Medications   Medication Sig Dispense Refill     aspirin-sod bicarb-citric acid (TRISHA-SELTZER) 324 mg TbEF Take 325 mg by mouth every 6 (six) hours as needed (takes once a day).       lisinopril (PRINIVIL,ZESTRIL) 10 MG tablet Take 1 tablet (10 mg total) by mouth daily As directed. 90 tablet 0     LORazepam (ATIVAN) 1 MG tablet TAKE 1/2 TO 1 TABLET BY MOUTH EVERY 8 HRS AS NEEDED FOR ANXIETY 30 tablet 0     LORazepam (ATIVAN) 1 MG tablet TAKE 1/2 TO 1 TABLET BY MOUTH EVERY 8 HRS AS NEEDED FOR ANXIETY. 30 tablet 0     multivitamin therapeutic (THERAGRAN) tablet Take 1 tablet by mouth daily.       omega-3 fatty acids-vitamin E 1,000 mg cap Take by mouth.       omeprazole (PRILOSEC) 20 MG capsule Take 20 mg by mouth daily.       PARoxetine (PAXIL) 20 MG tablet TAKE 3 TABLETS BY MOUTH EVERY  tablet 0     PARoxetine (PAXIL) 20 MG tablet Take 3 tablets (60 mg total) by mouth daily. 270 tablet 0     No current facility-administered medications for this visit.      Past Medical History:   Diagnosis Date     Anxiety      GERD (gastroesophageal reflux disease)      Hypertension      Past Surgical History:   Procedure Laterality Date     orchectomy       TONSILLECTOMY       Patient has no known " allergies.  Family History   Problem Relation Age of Onset     Diabetes Mother      Arthritis Father      Arthritis Brother      Social History     Socioeconomic History     Marital status:      Spouse name: Not on file     Number of children: Not on file     Years of education: Not on file     Highest education level: Not on file   Social Needs     Financial resource strain: Not on file     Food insecurity - worry: Not on file     Food insecurity - inability: Not on file     Transportation needs - medical: Not on file     Transportation needs - non-medical: Not on file   Occupational History     Not on file   Tobacco Use     Smoking status: Never Smoker   Substance and Sexual Activity     Alcohol use: Yes     Drug use: No     Sexual activity: Not on file   Other Topics Concern     Not on file   Social History Narrative     Not on file       Review of Systems  Comprehensive ROS: as above, otherwise all negative.           Objective:     /80 (Patient Site: Left Arm, Patient Position: Sitting, Cuff Size: Adult Large)   Pulse 80   Ht 6' (1.829 m)   Wt (!) 253 lb 1.6 oz (114.8 kg)   BMI 34.33 kg/m    Body mass index is 34.33 kg/m .    Physical    General Appearance:    Alert, cooperative, no distress, appears stated age.     Head:    Normocephalic, without obvious abnormality, atraumatic   Eyes:    PERRL, conjunctiva/corneas clear, EOM's intact, fundi     benign, both eyes        Ears:    Normal TM's and external ear canals, both ears   Nose:   Nares normal, septum midline, mucosa normal, no drainage    or sinus tenderness   Throat:   Lips, mucosa, and tongue normal; teeth and gums normal   Neck:   Supple, symmetrical, trachea midline, no adenopathy;        thyroid:  No enlargement/tenderness/nodules; no carotid    bruit or JVD   Back:     Symmetric, no curvature, ROM normal, no CVA tenderness   Lungs:     Clear to auscultation bilaterally, respirations unlabored   Chest wall:    No tenderness or  deformity   Heart:    Regular rate and rhythm, S1 and S2 normal, no murmur, rub   or gallop   Abdomen:     Soft, non-tender, bowel sounds active all four quadrants,     no masses, no organomegaly.     Genitalia:   Deferred per patient.   Rectal:   Deferred per patient.   Extremities:   Extremities normal, atraumatic, no cyanosis or edema   Pulses:   2+ and symmetric all extremities   Skin:   Skin color, texture, turgor normal, no rashes or lesions   Lymph nodes:   Cervical, supraclavicular, and axillary nodes normal   Neurologic:   CNII-XII intact. Normal strength, sensation and reflexes       throughout          This note has been dictated using voice recognition software. Any grammatical or context distortions are unintentional and inherent to the use of this software.

## 2021-06-23 NOTE — TELEPHONE ENCOUNTER
Refill Approved    Rx renewed per Medication Renewal Policy. Medication was last renewed on 10/10/18.    Ov: 11/23/18    Laya Kenny, [facility] Triage/Med Refill 1/11/2019     Requested Prescriptions   Pending Prescriptions Disp Refills     PARoxetine (PAXIL) 20 MG tablet [Pharmacy Med Name: PAROXETINE 20MG TABLETS] 270 tablet 0     Sig: TAKE 3 TABLETS BY MOUTH EVERY DAY    [facility] Refill Protocol  Passed - 1/11/2019  3:04 PM       Passed - PCP or prescribing provider visit in last year    Last office visit with prescriber/PCP: 9/27/2017 Oneil Stacy MD OR same dept: Visit date not found OR same specialty: 9/27/2017 Oneil Stacy MD  Last physical: 11/4/2015 Last MTM visit: Visit date not found   Next visit within 3 mo: Visit date not found  Next physical within 3 mo: Visit date not found  Prescriber OR PCP: Oneil Stacy MD  Last diagnosis associated with med order: 1. Anxiety state  - PARoxetine (PAXIL) 20 MG tablet [Pharmacy Med Name: PAROXETINE 20MG TABLETS]; TAKE 3 TABLETS BY MOUTH EVERY DAY  Dispense: 270 tablet; Refill: 0    If protocol passes may refill for 12 months if within 3 months of last provider visit (or a total of 15 months).

## 2021-06-25 NOTE — TELEPHONE ENCOUNTER
Refill Approved    Rx renewed per Medication Renewal Policy. Medication was last renewed on 11/23/18.    Arturo Fischer, [facility] Triage/Med Refill 3/18/2019     Requested Prescriptions   Pending Prescriptions Disp Refills     lisinopril (PRINIVIL,ZESTRIL) 10 MG tablet 90 tablet 0     Sig: Take 1 tablet (10 mg total) by mouth daily. As directed    Ace Inhibitors Refill Protocol Passed - 3/18/2019 10:16 AM       Passed - PCP or prescribing provider visit in past 12 months      Last office visit with prescriber/PCP: 9/27/2017 Oneil Stacy MD OR same dept: Visit date not found OR same specialty: 9/27/2017 Oneil Stacy MD  Last physical: 11/4/2015 Last MTM visit: Visit date not found   Next visit within 3 mo: Visit date not found  Next physical within 3 mo: Visit date not found  Prescriber OR PCP: Oneil Stacy MD  Last diagnosis associated with med order: 1. Essential hypertension  - lisinopril (PRINIVIL,ZESTRIL) 10 MG tablet; Take 1 tablet (10 mg total) by mouth daily. As directed  Dispense: 90 tablet; Refill: 0    If protocol passes may refill for 12 months if within 3 months of last provider visit (or a total of 15 months).            Passed - Serum Potassium in past 12 months    Lab Results   Component Value Date    Potassium 4.7 11/23/2018            Passed - Blood pressure filed in past 12 months    BP Readings from Last 1 Encounters:   11/23/18 122/80            Passed - Serum Creatinine in past 12 months    Creatinine   Date Value Ref Range Status   11/23/2018 0.89 0.70 - 1.30 mg/dL Final

## 2021-07-03 NOTE — ADDENDUM NOTE
Addendum Note by Jhonatan Cole CNP at 11/23/2018  9:20 AM     Author: Jhonatan Cole CNP Service: -- Author Type: Nurse Practitioner    Filed: 11/27/2018  9:49 AM Encounter Date: 11/23/2018 Status: Signed    : Jhonatan Cole CNP (Nurse Practitioner)    Addended by: JHONATAN COLE on: 11/27/2018 09:49 AM        Modules accepted: Orders

## 2021-10-02 ENCOUNTER — HEALTH MAINTENANCE LETTER (OUTPATIENT)
Age: 61
End: 2021-10-02

## 2022-05-30 ASSESSMENT — PATIENT HEALTH QUESTIONNAIRE - PHQ9
10. IF YOU CHECKED OFF ANY PROBLEMS, HOW DIFFICULT HAVE THESE PROBLEMS MADE IT FOR YOU TO DO YOUR WORK, TAKE CARE OF THINGS AT HOME, OR GET ALONG WITH OTHER PEOPLE: SOMEWHAT DIFFICULT
SUM OF ALL RESPONSES TO PHQ QUESTIONS 1-9: 3
SUM OF ALL RESPONSES TO PHQ QUESTIONS 1-9: 3

## 2022-05-30 ASSESSMENT — ANXIETY QUESTIONNAIRES
6. BECOMING EASILY ANNOYED OR IRRITABLE: SEVERAL DAYS
8. IF YOU CHECKED OFF ANY PROBLEMS, HOW DIFFICULT HAVE THESE MADE IT FOR YOU TO DO YOUR WORK, TAKE CARE OF THINGS AT HOME, OR GET ALONG WITH OTHER PEOPLE?: NOT DIFFICULT AT ALL
GAD7 TOTAL SCORE: 1
1. FEELING NERVOUS, ANXIOUS, OR ON EDGE: NOT AT ALL
7. FEELING AFRAID AS IF SOMETHING AWFUL MIGHT HAPPEN: NOT AT ALL
5. BEING SO RESTLESS THAT IT IS HARD TO SIT STILL: NOT AT ALL
7. FEELING AFRAID AS IF SOMETHING AWFUL MIGHT HAPPEN: NOT AT ALL
4. TROUBLE RELAXING: NOT AT ALL
GAD7 TOTAL SCORE: 1
3. WORRYING TOO MUCH ABOUT DIFFERENT THINGS: NOT AT ALL
GAD7 TOTAL SCORE: 1
2. NOT BEING ABLE TO STOP OR CONTROL WORRYING: NOT AT ALL

## 2022-05-31 ENCOUNTER — OFFICE VISIT (OUTPATIENT)
Dept: FAMILY MEDICINE | Facility: CLINIC | Age: 62
End: 2022-05-31
Payer: COMMERCIAL

## 2022-05-31 VITALS
OXYGEN SATURATION: 96 % | HEART RATE: 76 BPM | RESPIRATION RATE: 18 BRPM | DIASTOLIC BLOOD PRESSURE: 84 MMHG | SYSTOLIC BLOOD PRESSURE: 148 MMHG | HEIGHT: 72 IN | BODY MASS INDEX: 34.13 KG/M2 | TEMPERATURE: 97.8 F | WEIGHT: 252 LBS

## 2022-05-31 DIAGNOSIS — E11.9 TYPE 2 DIABETES MELLITUS WITHOUT COMPLICATION, WITHOUT LONG-TERM CURRENT USE OF INSULIN (H): ICD-10-CM

## 2022-05-31 DIAGNOSIS — R51.9 NONINTRACTABLE EPISODIC HEADACHE, UNSPECIFIED HEADACHE TYPE: ICD-10-CM

## 2022-05-31 DIAGNOSIS — F41.1 ANXIETY STATE: ICD-10-CM

## 2022-05-31 DIAGNOSIS — I10 ESSENTIAL HYPERTENSION: ICD-10-CM

## 2022-05-31 DIAGNOSIS — E78.2 MIXED HYPERLIPIDEMIA: ICD-10-CM

## 2022-05-31 DIAGNOSIS — Z11.59 NEED FOR HEPATITIS C SCREENING TEST: ICD-10-CM

## 2022-05-31 DIAGNOSIS — Z11.4 SCREENING FOR HIV (HUMAN IMMUNODEFICIENCY VIRUS): ICD-10-CM

## 2022-05-31 DIAGNOSIS — R05.9 COUGH: Primary | ICD-10-CM

## 2022-05-31 LAB
ANION GAP SERPL CALCULATED.3IONS-SCNC: 11 MMOL/L (ref 5–18)
BUN SERPL-MCNC: 18 MG/DL (ref 8–22)
CALCIUM SERPL-MCNC: 10.7 MG/DL (ref 8.5–10.5)
CHLORIDE BLD-SCNC: 107 MMOL/L (ref 98–107)
CHOLEST SERPL-MCNC: 245 MG/DL
CO2 SERPL-SCNC: 24 MMOL/L (ref 22–31)
CREAT SERPL-MCNC: 1.08 MG/DL (ref 0.7–1.3)
FASTING STATUS PATIENT QL REPORTED: YES
GFR SERPL CREATININE-BSD FRML MDRD: 78 ML/MIN/1.73M2
GLUCOSE BLD-MCNC: 129 MG/DL (ref 70–125)
HBA1C MFR BLD: 6 % (ref 0–5.6)
HDLC SERPL-MCNC: 42 MG/DL
HIV 1+2 AB+HIV1 P24 AG SERPL QL IA: NEGATIVE
LDLC SERPL CALC-MCNC: 146 MG/DL
POTASSIUM BLD-SCNC: 5 MMOL/L (ref 3.5–5)
SODIUM SERPL-SCNC: 142 MMOL/L (ref 136–145)
TRIGL SERPL-MCNC: 284 MG/DL

## 2022-05-31 PROCEDURE — 80061 LIPID PANEL: CPT | Performed by: FAMILY MEDICINE

## 2022-05-31 PROCEDURE — 86803 HEPATITIS C AB TEST: CPT | Performed by: FAMILY MEDICINE

## 2022-05-31 PROCEDURE — 80048 BASIC METABOLIC PNL TOTAL CA: CPT | Performed by: FAMILY MEDICINE

## 2022-05-31 PROCEDURE — 87389 HIV-1 AG W/HIV-1&-2 AB AG IA: CPT | Performed by: FAMILY MEDICINE

## 2022-05-31 PROCEDURE — 36415 COLL VENOUS BLD VENIPUNCTURE: CPT | Performed by: FAMILY MEDICINE

## 2022-05-31 PROCEDURE — 99214 OFFICE O/P EST MOD 30 MIN: CPT | Performed by: FAMILY MEDICINE

## 2022-05-31 PROCEDURE — 83036 HEMOGLOBIN GLYCOSYLATED A1C: CPT | Performed by: FAMILY MEDICINE

## 2022-05-31 RX ORDER — LOSARTAN POTASSIUM 50 MG/1
50 TABLET ORAL DAILY
Qty: 90 TABLET | Refills: 3 | Status: SHIPPED | OUTPATIENT
Start: 2022-05-31 | End: 2023-08-07

## 2022-05-31 RX ORDER — LISINOPRIL 10 MG/1
10 TABLET ORAL DAILY
Qty: 90 TABLET | Refills: 1 | Status: CANCELLED | OUTPATIENT
Start: 2022-05-31

## 2022-05-31 RX ORDER — PAROXETINE 20 MG/1
TABLET, FILM COATED ORAL
Qty: 270 TABLET | Refills: 3 | Status: SHIPPED | OUTPATIENT
Start: 2022-05-31 | End: 2023-08-07

## 2022-05-31 NOTE — PROGRESS NOTES
Assessment/Plan:    Cough  Patient will continue PPI currently.  Add fluticasone nasal spray.  Chest x-ray completed.  Negative findings.  Anticipate reassessment at physical exam in 4 weeks to ensure desired improvement.  - XR Chest 2 Views    Essential hypertension  We will substitute losartan 50 mg daily for lisinopril 10 mg daily.  Hypertension with suboptimal control off lisinopril currently over past several months.  Due to chronic cough likely unrelated to ACE inhibitor did switch however to angiotensin receptor blocker as noted and reassess blood pressure at physical exam in 4 weeks.  - REVIEW OF HEALTH MAINTENANCE PROTOCOL ORDERS  - losartan (COZAAR) 50 MG tablet  Dispense: 90 tablet; Refill: 3  - Basic metabolic panel    Anxiety  Continues benefits paroxetine 20 mg using 3 tablets daily.  Refill provided.  - PARoxetine (PAXIL) 20 MG tablet  Dispense: 270 tablet; Refill: 3    Screening for HIV (human immunodeficiency virus)  Routine HIV screen based on age criteria.  - HIV Antigen Antibody Combo    Need for hepatitis C screening test  Routine hepatitis C screen based on age criteria.  - Hepatitis C Screen Reflex to HCV RNA Quant and Genotype    Anxiety state  As above, did refill paroxetine 60 mg daily.  - PARoxetine (PAXIL) 20 MG tablet  Dispense: 270 tablet; Refill: 3    Type 2 diabetes mellitus without complication, without long-term current use of insulin (H)  Prior A1c noted to be 6.6% in distant past.  We will update A1c today and fasting glucose.  Dietary and exercise modifications for weight goal less than 240 pounds initially, less than 220 pounds ideally.  A1c did return today at 6%.  - Basic metabolic panel  - Hemoglobin A1c    Mixed hyperlipidemia  Check lipid cascade today while fasting.  Discussed statin therapy with type 2 diabetes history.  - Lipid panel reflex to direct LDL Fasting    Nonintractable episodic headache, unspecified headache type  Headaches, intermittent.  Ibuprofen 400 mg has  "been utilized.  May use 800 mg 3 times daily on as-needed basis.  Consider [facility] paperwork if persistent issues or missing work following prior work-related injury 2020.      The following high BMI interventions were performed this visit: encouragement to exercise, weight monitoring, weight loss from baseline weight and lifestyle education regarding diet .  Ensure ongoing efforts to achieve weight goal < 240 pounds initially, < 220 pounds ideally.         Subjective:    Oneil Fuller is seen today for several concerns.  Cough, peer persistent.  Symptoms over past 1 to 2 years perhaps.  Has in fact been off lisinopril since earlier this year after running out and not getting refilled.  Cough persists.  No history of asthma.  Non-smoker.  No shortness of breath.  No hemoptysis.  No unintentional weight loss.  Does have underlying anxiety benefiting paroxetine 60 mg daily.  Needs refill.  Hyperlipidemia history.  Headaches associated with work-related head injury in 2020 apparently.  Using ibuprofen 400 mg intermittently.  Omeprazole 20 mg daily helps with reflux management.  Runny nose and some postnasal drainage.  Has not tried fluticasone.  Comprehensive review of systems as above otherwise all negative.     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, orchectomy, yearssecond surgery, CXR q 5 years for monitoring (last performed 14); eye muscle surgery due to strabismus; right forehead cyst excised 2010; wisdom teeth; tonsils age 5   Hospitalizations: as above   Mom -  79 due to diabetes > 30 years with bilateral lower extremity amputation; hemodialysis x 6 years   Dad - arthritis; 3 knee replacements   1 bro - arthritis and \"heart issues\"   2 sis -   Work: unemployeed currently - h/o  ( working for [email or web address] in this building); 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    Past Surgical History:   Procedure Laterality Date     OTHER SURGICAL HISTORY      orchectomy     TONSILLECTOMY          Family History   Problem Relation Age of Onset     Diabetes Mother      Arthritis Father      Arthritis Brother         Past Medical History:   Diagnosis Date     Anxiety      GERD (gastroesophageal reflux disease)      Hypertension         Social History     Tobacco Use     Smoking status: Never Smoker     Smokeless tobacco: Never Used   Substance Use Topics     Alcohol use: Yes     Drug use: No        Current Outpatient Medications   Medication Sig Dispense Refill     aspirin-sod bicarb-citric acid (TRISHA-SELTZER) 324 mg TbEF [ASPIRIN-SOD BICARB-CITRIC ACID (TRISHA-SELTZER) 324 MG TBEF] Take 325 mg by mouth every 6 (six) hours as needed (takes once a day).       losartan (COZAAR) 50 MG tablet Take 1 tablet (50 mg) by mouth daily 90 tablet 3     omeprazole (PRILOSEC) 20 MG capsule [OMEPRAZOLE (PRILOSEC) 20 MG CAPSULE] Take 20 mg by mouth daily.       PARoxetine (PAXIL) 20 MG tablet [PAROXETINE (PAXIL) 20 MG TABLET] TAKE 3 TABLETS(60 MG) BY MOUTH DAILY 270 tablet 3          Objective:    Vitals:    05/31/22 0733 05/31/22 0756   BP: (!) 168/96 (!) 148/84   Pulse: 76    Resp: 18    Temp: 97.8  F (36.6  C)    TempSrc: Oral    SpO2: 96%    Weight: 114.3 kg (252 lb)    Height: 1.829 m (6')       Body mass index is 34.18 kg/m .    Alert.  No apparent distress with blood pressure 148/84 on recheck.  Chest clear.  Cardiac exam regular.  Abdomen benign.  BMI 34.18.  Extremities warm and dry.      This note has been dictated using voice recognition software and as a result may contain minor grammatical errors and unintended word substitutions.       Answers for HPI/ROS submitted by the patient on 5/30/2022  If you checked off any problems, how difficult have these problems made it for you to do your work, take care of things at home, or get along with other people?:  Somewhat difficult  PHQ9 TOTAL SCORE: 3  ELIOT 7 TOTAL SCORE: 1  Headache Symptoms are: no change  How often are you getting headaches or migraines? : 3 to 5 days a week  Are you able to do normal daily activities when you have a migraine?: Yes  Migraine Rescue/Relief Medications:: no rescue/relief medications, Ibuprofen (Advil, Motrin), Naproxyn (Aleve), Tylenol  Effectiveness of rescue/relief medications:: I get some relief  Migraine Preventative Medications:: no medications to prevent migraines  ER or UC Visits:: 0 times  Do you have a cough?: No  Are you experiencing any wheezing in your chest?: No  Do you have any shortness of breath?: Yes  Use of rescue inhaler:: never  Taking Asthma medication as prescribed:: I do not have an asthma controller medication  Asthma triggers:: unaware of any triggers  Have you had any Emergency Room visits, Urgent Care visits, or Hospital Admissions since your last office visit?: No  How many servings of fruits and vegetables do you eat daily?: 4 or more  On average, how many sweetened beverages do you drink each day (Examples: soda, juice, sweet tea, etc.  Do NOT count diet or artificially sweetened beverages)?: 2  How many minutes a day do you exercise enough to make your heart beat faster?: 9 or less  How many days a week do you exercise enough to make your heart beat faster?: 3 or less  How many days per week do you miss taking your medication?: 2  What makes it hard for you to take your medication every day?: remembering to take

## 2022-06-01 LAB — HCV AB SERPL QL IA: NONREACTIVE

## 2022-07-08 ENCOUNTER — OFFICE VISIT (OUTPATIENT)
Dept: FAMILY MEDICINE | Facility: CLINIC | Age: 62
End: 2022-07-08
Attending: FAMILY MEDICINE
Payer: COMMERCIAL

## 2022-07-08 VITALS
SYSTOLIC BLOOD PRESSURE: 122 MMHG | HEIGHT: 72 IN | WEIGHT: 245 LBS | OXYGEN SATURATION: 97 % | HEART RATE: 70 BPM | DIASTOLIC BLOOD PRESSURE: 84 MMHG | BODY MASS INDEX: 33.18 KG/M2

## 2022-07-08 DIAGNOSIS — R51.9 NONINTRACTABLE EPISODIC HEADACHE, UNSPECIFIED HEADACHE TYPE: ICD-10-CM

## 2022-07-08 DIAGNOSIS — E66.811 CLASS 1 OBESITY DUE TO EXCESS CALORIES WITH SERIOUS COMORBIDITY AND BODY MASS INDEX (BMI) OF 33.0 TO 33.9 IN ADULT: ICD-10-CM

## 2022-07-08 DIAGNOSIS — E66.09 CLASS 1 OBESITY DUE TO EXCESS CALORIES WITH SERIOUS COMORBIDITY AND BODY MASS INDEX (BMI) OF 33.0 TO 33.9 IN ADULT: ICD-10-CM

## 2022-07-08 DIAGNOSIS — L91.8 SKIN TAG: ICD-10-CM

## 2022-07-08 DIAGNOSIS — Z23 ENCOUNTER FOR IMMUNIZATION: ICD-10-CM

## 2022-07-08 DIAGNOSIS — I10 ESSENTIAL HYPERTENSION: ICD-10-CM

## 2022-07-08 DIAGNOSIS — Z12.5 SCREENING FOR PROSTATE CANCER: ICD-10-CM

## 2022-07-08 DIAGNOSIS — L98.9 SKIN LESION OF NECK: ICD-10-CM

## 2022-07-08 DIAGNOSIS — E78.2 MIXED HYPERLIPIDEMIA: ICD-10-CM

## 2022-07-08 DIAGNOSIS — E83.52 HYPERCALCEMIA: ICD-10-CM

## 2022-07-08 DIAGNOSIS — Z00.00 ROUTINE PHYSICAL EXAMINATION: Primary | ICD-10-CM

## 2022-07-08 DIAGNOSIS — Z12.11 COLON CANCER SCREENING: ICD-10-CM

## 2022-07-08 DIAGNOSIS — E11.9 TYPE 2 DIABETES MELLITUS WITHOUT COMPLICATION, WITHOUT LONG-TERM CURRENT USE OF INSULIN (H): ICD-10-CM

## 2022-07-08 DIAGNOSIS — F41.1 ANXIETY STATE: ICD-10-CM

## 2022-07-08 LAB
ANION GAP SERPL CALCULATED.3IONS-SCNC: 10 MMOL/L (ref 7–15)
BUN SERPL-MCNC: 20.8 MG/DL (ref 8–23)
CALCIUM SERPL-MCNC: 10.5 MG/DL (ref 8.8–10.2)
CHLORIDE SERPL-SCNC: 105 MMOL/L (ref 98–107)
CHOLEST SERPL-MCNC: 230 MG/DL
CREAT SERPL-MCNC: 1.1 MG/DL (ref 0.67–1.17)
CREAT UR-MCNC: 134 MG/DL
DEPRECATED HCO3 PLAS-SCNC: 26 MMOL/L (ref 22–29)
GFR SERPL CREATININE-BSD FRML MDRD: 76 ML/MIN/1.73M2
GLUCOSE SERPL-MCNC: 126 MG/DL (ref 70–99)
HDLC SERPL-MCNC: 38 MG/DL
HOLD SPECIMEN: NORMAL
LDLC SERPL CALC-MCNC: 139 MG/DL
MICROALBUMIN UR-MCNC: <12 MG/L
MICROALBUMIN/CREAT UR: NORMAL MG/G{CREAT}
NONHDLC SERPL-MCNC: 192 MG/DL
POTASSIUM SERPL-SCNC: 5.2 MMOL/L (ref 3.4–5.3)
PSA SERPL-MCNC: 0.27 NG/ML (ref 0–4.5)
SODIUM SERPL-SCNC: 141 MMOL/L (ref 136–145)
TRIGL SERPL-MCNC: 265 MG/DL

## 2022-07-08 PROCEDURE — 99396 PREV VISIT EST AGE 40-64: CPT | Mod: 25 | Performed by: FAMILY MEDICINE

## 2022-07-08 PROCEDURE — 90471 IMMUNIZATION ADMIN: CPT | Performed by: FAMILY MEDICINE

## 2022-07-08 PROCEDURE — 36415 COLL VENOUS BLD VENIPUNCTURE: CPT | Performed by: FAMILY MEDICINE

## 2022-07-08 PROCEDURE — 80048 BASIC METABOLIC PNL TOTAL CA: CPT | Performed by: FAMILY MEDICINE

## 2022-07-08 PROCEDURE — 82043 UR ALBUMIN QUANTITATIVE: CPT | Performed by: FAMILY MEDICINE

## 2022-07-08 PROCEDURE — 90750 HZV VACC RECOMBINANT IM: CPT | Performed by: FAMILY MEDICINE

## 2022-07-08 PROCEDURE — 80061 LIPID PANEL: CPT | Performed by: FAMILY MEDICINE

## 2022-07-08 PROCEDURE — G0103 PSA SCREENING: HCPCS | Performed by: FAMILY MEDICINE

## 2022-07-08 PROCEDURE — 99213 OFFICE O/P EST LOW 20 MIN: CPT | Mod: 25 | Performed by: FAMILY MEDICINE

## 2022-07-08 PROCEDURE — 90472 IMMUNIZATION ADMIN EACH ADD: CPT | Performed by: FAMILY MEDICINE

## 2022-07-08 PROCEDURE — 90677 PCV20 VACCINE IM: CPT | Performed by: FAMILY MEDICINE

## 2022-07-08 ASSESSMENT — ENCOUNTER SYMPTOMS
HEADACHES: 1
DIARRHEA: 0
SHORTNESS OF BREATH: 0
ARTHRALGIAS: 0
PARESTHESIAS: 0
DYSURIA: 0
NAUSEA: 0
DIZZINESS: 0
WEAKNESS: 0
NERVOUS/ANXIOUS: 0
HEMATURIA: 0
MYALGIAS: 1
COUGH: 0
CHILLS: 0
PALPITATIONS: 0
JOINT SWELLING: 0
HEMATOCHEZIA: 0
SORE THROAT: 0
CONSTIPATION: 0
HEARTBURN: 1
ABDOMINAL PAIN: 0
FEVER: 0
EYE PAIN: 0
FREQUENCY: 0

## 2022-07-08 NOTE — PROGRESS NOTES
Assessment/Plan:     Routine physical examination  Routine healthcare maintenance.  Preventative cares reviewed.  Annual physical exams to continue.    Essential hypertension  Hypertension appears well controlled blood pressure 124/84 after switching from lisinopril 10 mg daily to losartan 50 mg daily previously due to cough.  Cough has resolved.  - Basic metabolic panel    Anxiety state  Paroxetine 60 mg daily continues.    Type 2 diabetes mellitus without complication, without long-term current use of insulin (H)  Type 2 diabetes appears well controlled with A1c of 6%.  Patient hesitant to use statin therapy.  Does agree to aspirin 81 mg daily.  Check urine for microalbumin.  Monofilament testing normal.  States eye exam last month through [employer] and will ensure retinal exam completed with yearly eye exams.  - Albumin Random Urine Quantitative with Creat Ratio  - aspirin (ASA) 81 MG EC tablet  Dispense: 100 tablet; Refill: 3    Mixed hyperlipidemia  Update lipid cascade.  7 pound weight loss since May 31, 2022 and elects to repeat lipid cascade today in order to remain off statin therapy.  Hopes to achieve weight goal less than 230 pounds initially, less than 220 pounds ideally.  - Lipid panel reflex to direct LDL Fasting    Nonintractable episodic headache, unspecified headache type  Closed head injury described December 9, 2020 with concussion.  Headaches more manageable and treated more by decreasing level of exertion or activity.    Hypercalcemia  Update basic metabolic panel.  Prior mild elevation with calcium 10.7.  Ensure adequate hydration.  - Basic metabolic panel    Class 1 obesity due to excess calories with serious comorbidity and body mass index (BMI) of 33.0 to 33.9 in adult  Dietary and exercise modification for weight goal less than 230 pounds initially, less than 220 pounds ideally with reassessment at diabetes follow-up in 4 months.    Encounter for immunization  Pneumococcal 20 and  Shingrix immunization provided.  Will provide Shingrix booster at diabetes follow-up in 4 months.  - Pneumococcal 20 Valent Conjugate (Prevnar 20)  - ZOSTER VACCINE RECOMBINANT ADJUVANTED (SHINGRIX)    Skin tag  Recurrent skin tags, multiple.  We will have dermatologist treat at time of evaluation for skin lesion posterior neck as noted below.  - Adult Dermatology Referral    Screening for prostate cancer  PSA for prostate cancer screening.  Digital rectal exam normal with symmetric exam without induration or nodularity or asymmetry.  - Prostate Specific Antigen Screen    Skin lesion of neck  Vascular lesion posterior neck at base described as enlarging.  We will have dermatologist review for definitive excisional procedure likely.  - Adult Dermatology Referral    Colon cancer screening  Colonoscopy referral updated to confirm follow-up recommendations.  Colonoscopy April 15, 2014.  - Adult GI  Referral - Procedure Only          Subjective:     Oneil Fuller is a 62 year old male who presents for an annual exam.  In general doing relatively well.  Prior cough better with use of Flonase as well as switching from lisinopril 10 mg daily to losartan 50 mg daily for hypertension management.  Prior A1c as high as 6.6% and most recently 6.0% May 31, 2022.  Paroxetine 60 mg daily for anxiety.  Wants to stay off statin therapy.  Does agree to aspirin 81 mg daily with diabetes history.  Prior calcium mildly elevated 10.7.  No muscle cramps etc.  Maintaining appropriate hydration.  Episodic headaches associated with history of concussion December 9, 2020.  Doing better however with rest as treatment or OTC analgesic.  Has skin lesion at base of neck that he would like to have looked at as well as recurrent skin tags that he would like to have removed.  Did have colonoscopy April 15, 2014 unclear follow-up recommendation.  Needs Shingrix immunization series.  Wants to hold off on COVID-19 second booster and did have  "primary series completed with Pfizer followed by [employer] first booster 2022 apparently.      Answers for HPI/ROS submitted by the patient on 2022  abdominal pain: No  Blood in stool: No  Blood in urine: No  chest pain: No  chills: No  congestion: No  constipation: No  cough: No  diarrhea: No  dizziness: No  ear pain: No  eye pain: No  nervous/anxious: No  fever: No  frequency: No  genital sores: No  headaches: Yes  hearing loss: No  heartburn: Yes  arthralgias: No  joint swelling: No  peripheral edema: No  mood changes: No  myalgias: Yes  nausea: No  dysuria: No  palpitations: No  Skin sensation changes: No  sore throat: No  urgency: No  rash: No  shortness of breath: No  visual disturbance: No  weakness: No  impotence: No  penile discharge: No       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, orchectomy, yearssecond surgery, CXR q 5 years for monitoring (last performed 14); eye muscle surgery due to strabismus; right forehead cyst excised 2010; wisdom teeth; tonsils age 5   Hospitalizations: as above   Mom -  79 due to diabetes > 30 years with bilateral lower extremity amputation; hemodialysis x 6 years   Dad - arthritis; 3 knee replacements   1 bro - arthritis and \"heart issues\"   2 sis -   Work: [email or web address] - h/o  ( working for [employer] in this building); 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      Healthy Habits:     Getting at least 3 servings of Calcium per day:  NO    Bi-annual eye exam:  Yes    Dental care twice a year:  NO    Sleep apnea or symptoms of sleep apnea:  None    Diet:  Regular (no restrictions)    Frequency of exercise:  4-5 days/week    Duration of exercise:  Less than 15 minutes    Taking medications regularly:  Yes    PHQ-2 Total Score: 0    Additional concerns today:  No       Immunization " History   Administered Date(s) Administered     COVID-19,PF,Barbara 01/06/2022     COVID-19,PF,Pfizer (12+ Yrs) 04/30/2021, 05/21/2021     Influenza Vaccine IM > 6 months Valent IIV4 (Alfuria,Fluzone) 11/04/2015     Tdap (Adacel,Boostrix) 07/29/2015     Immunization status: Needs pneumococcal 20 and elects to begin Shingrix immunization series    Current Outpatient Medications   Medication Sig Dispense Refill     aspirin (ASA) 81 MG EC tablet Take 1 tablet (81 mg) by mouth daily 100 tablet 3     aspirin-sod bicarb-citric acid (TRISHA-SELTZER) 324 mg TbEF [ASPIRIN-SOD BICARB-CITRIC ACID (TRISHA-SELTZER) 324 MG TBEF] Take 325 mg by mouth every 6 (six) hours as needed (takes once a day).       losartan (COZAAR) 50 MG tablet Take 1 tablet (50 mg) by mouth daily 90 tablet 3     omeprazole (PRILOSEC) 20 MG capsule [OMEPRAZOLE (PRILOSEC) 20 MG CAPSULE] Take 20 mg by mouth daily.       PARoxetine (PAXIL) 20 MG tablet [PAROXETINE (PAXIL) 20 MG TABLET] TAKE 3 TABLETS(60 MG) BY MOUTH DAILY 270 tablet 3     Past Medical History:   Diagnosis Date     Anxiety      GERD (gastroesophageal reflux disease)      Hypertension      Past Surgical History:   Procedure Laterality Date     OTHER SURGICAL HISTORY      orchectomy     TONSILLECTOMY       Patient has no known allergies.  Family History   Problem Relation Age of Onset     Diabetes Mother      Arthritis Father      Arthritis Brother      Social History     Socioeconomic History     Marital status:      Spouse name: Not on file     Number of children: Not on file     Years of education: Not on file     Highest education level: Not on file   Occupational History     Not on file   Tobacco Use     Smoking status: Never Smoker     Smokeless tobacco: Never Used   Substance and Sexual Activity     Alcohol use: Yes     Drug use: No     Sexual activity: Not on file   Other Topics Concern     Not on file   Social History Narrative     Not on file     Social Determinants of Health      Financial Resource Strain: Not on file   Food Insecurity: Not on file   Transportation Needs: Not on file   Physical Activity: Not on file   Stress: Not on file   Social Connections: Not on file   Intimate Partner Violence: Not on file   Housing Stability: Not on file       Review of Systems  Comprehensive ROS: as above, otherwise all negative.           Objective:     /84   Pulse 70   Ht 1.829 m (6')   Wt 111.1 kg (245 lb)   SpO2 97%   BMI 33.23 kg/m    Body mass index is 33.23 kg/m .    Physical    General Appearance:    Alert, cooperative, no distress, appears stated age.  BMI 33.23.   Head:    Normocephalic, without obvious abnormality, atraumatic   Eyes:    PERRL, conjunctiva/corneas clear, EOM's intact, fundi     benign, both eyes        Ears:    Normal TM's and external ear canals, both ears   Nose:   Nares normal, septum midline, mucosa normal, no drainage    or sinus tenderness   Throat:   Lips, mucosa, and tongue normal; teeth and gums normal   Neck:   Supple, symmetrical, trachea midline, no adenopathy;        thyroid:  No enlargement/tenderness/nodules; no carotid    bruit or JVD   Back:     Symmetric, no curvature, ROM normal, no CVA tenderness   Lungs:     Clear to auscultation bilaterally, respirations unlabored   Chest wall:    No tenderness or deformity   Heart:    Regular rate and rhythm, S1 and S2 normal, no murmur, rub   or gallop   Abdomen:     Soft, non-tender, bowel sounds active all four quadrants,     no masses, no organomegaly.     Genitalia:    Normal male without lesion, discharge or tenderness.  No inguinal hernia noted.     Rectal:    Normal tone.  Prostate normal/symmetric, no masses or tenderness.   Extremities:   Extremities normal, atraumatic, no cyanosis or edema   Pulses:   2+ and symmetric all extremities   Skin:   Skin color, texture, turgor normal, no rashes.  Multiple skin tags.  Vascular lesion base of neck posterior.   Lymph nodes:   Cervical, supraclavicular,  and axillary nodes normal   Neurologic:   CNII-XII intact. Normal strength, sensation and reflexes       throughout                This note has been dictated using voice recognition software and as a result may contain minor grammatical errors and unintended word substitutions.

## 2022-08-02 ENCOUNTER — TELEPHONE (OUTPATIENT)
Dept: FAMILY MEDICINE | Facility: CLINIC | Age: 62
End: 2022-08-02

## 2022-08-02 NOTE — TELEPHONE ENCOUNTER
Pt is calling regarding headaches hes been having. Pt states that this morning he woke up with one and took naproxin and an aspirin then napped for about an hour. When he woke up his headache was worse.     Pt states that he has a history of concussions and is wondering what further testing should be done to address these headaches he has been experiencing.    Please advise and inform patient of next steps.

## 2022-08-11 ENCOUNTER — MYC MEDICAL ADVICE (OUTPATIENT)
Dept: FAMILY MEDICINE | Facility: CLINIC | Age: 62
End: 2022-08-11

## 2022-08-11 DIAGNOSIS — R51.9 NONINTRACTABLE EPISODIC HEADACHE, UNSPECIFIED HEADACHE TYPE: Primary | ICD-10-CM

## 2022-08-11 NOTE — TELEPHONE ENCOUNTER
Patient requesting referral for headaches to get more testing done. Pended referral to neurology if PCP deems appropriate.    Thank you,    Lina VILLARREAL RN    From OV on 05/31/2022 with Dr. Stacy:  Nonintractable episodic headache, unspecified headache type  Headaches, intermittent.  Ibuprofen 400 mg has been utilized.  May use 800 mg 3 times daily on as-needed basis.  Consider FMLA paperwork if persistent issues or missing work following prior work-related injury December 2020.    From OV on 07/08/2022 with Dr. Stacy:  Nonintractable episodic headache, unspecified headache type  Closed head injury described December 9, 2020 with concussion.  Headaches more manageable and treated more by decreasing level of exertion or activity.  Nonintractable episodic headache, unspecified headache type  Closed head injury described December 9, 2020 with concussion.  Headaches more manageable and treated more by decreasing level of exertion or activity.

## 2022-08-12 NOTE — TELEPHONE ENCOUNTER
FYI:  Referral signed in order to see neurology re: headaches.  Notify patient to ensure that he is able to schedule appointment.

## 2022-09-01 ENCOUNTER — E-VISIT (OUTPATIENT)
Dept: FAMILY MEDICINE | Facility: CLINIC | Age: 62
End: 2022-09-01
Payer: COMMERCIAL

## 2022-09-01 DIAGNOSIS — R51.9 CHRONIC NONINTRACTABLE HEADACHE, UNSPECIFIED HEADACHE TYPE: Primary | ICD-10-CM

## 2022-09-01 DIAGNOSIS — G89.29 CHRONIC NONINTRACTABLE HEADACHE, UNSPECIFIED HEADACHE TYPE: Primary | ICD-10-CM

## 2022-09-01 PROCEDURE — 99207 PR NON-BILLABLE SERV PER CHARTING: CPT | Performed by: FAMILY MEDICINE

## 2022-09-01 NOTE — PATIENT INSTRUCTIONS
Thank you for choosing us for your care. I think an in-clinic visit would be best next steps based on your symptoms. Please schedule a clinic appointment; you won t be charged for this eVisit.      You can schedule an appointment right here in [facility], or call [phone]      Tension Headache     A muscle tension headache is a very common cause of head pain. It s also called a stress headache. When some people are under stress, they tense the muscles of their shoulder, neck, and scalp without knowing it. If this tension lasts long enough, a headache can occur. A tension headache can be quite painful. It can last for hours or even days.  Home care  Follow these tips when caring for yourself at home:    Don t drive yourself home if you were given pain medicine for your headache. Instead, have someone else drive you home. Try to sleep when you get home. You should feel much better when you wake up.    Put heat on the back of your neck to help ease neck spasm.  How to prevent tension-type headaches    Figure out what is causing stress in your life. Learn new ways to handle your stress. Ideas include regular exercise, biofeedback, self-hypnosis, yoga, and meditation. Talk with your healthcare provider to find out more information about managing stress. Many books and digital media are also available on this subject.    Take time out at the first sign of a tension headache, if possible. Take yourself out of the stressful situation. Find a quiet, comfortable place to sit or lie down and let yourself relax. Heat and deep massage of the tight areas in the neck and shoulders may help ease muscle spasm. You may also get relief from a medicine such as acetaminophen, ibuprofen, naproxen, or a prescribed muscle relaxant.    Follow-up care  Follow up with your healthcare provider, or as advised. Talk with your provider if you have frequent headaches. He or she can figure out a treatment plan. Ask if you can have medicine to  take at home the next time you get a bad headache. This may keep you from having to visit the emergency department in the future. You may need to see a headache specialist (neurologist) if you continue to have headaches.  When to seek medical advice  Call your healthcare provider right away if any of these occur:    Your head pain gets worse during sexual intercourse or strenuous activity    Your head pain doesn t get better within 24 hours    You aren t able to keep liquids down (repeated vomiting)    Fever of 100.4 F (38 C) or higher, or as directed by your healthcare provider    Stiff neck    Extreme drowsiness, confusion, or fainting    Dizziness or dizziness with spinning sensation (vertigo)    Weakness in an arm or leg or one side of your face    You have trouble speaking    Your vision changes  [employer] last reviewed this educational content on 10/1/2019    0260-0964 [employer]. All rights reserved. This information is not intended as a substitute for professional medical care. Always follow your healthcare professional's instructions.

## 2022-09-07 ENCOUNTER — OFFICE VISIT (OUTPATIENT)
Dept: FAMILY MEDICINE | Facility: CLINIC | Age: 62
End: 2022-09-07
Payer: COMMERCIAL

## 2022-09-07 VITALS
DIASTOLIC BLOOD PRESSURE: 66 MMHG | WEIGHT: 251 LBS | OXYGEN SATURATION: 96 % | BODY MASS INDEX: 34.04 KG/M2 | SYSTOLIC BLOOD PRESSURE: 138 MMHG | HEART RATE: 109 BPM

## 2022-09-07 DIAGNOSIS — I10 ESSENTIAL HYPERTENSION: ICD-10-CM

## 2022-09-07 DIAGNOSIS — R51.9 CHRONIC NONINTRACTABLE HEADACHE, UNSPECIFIED HEADACHE TYPE: Primary | ICD-10-CM

## 2022-09-07 DIAGNOSIS — G89.29 CHRONIC NONINTRACTABLE HEADACHE, UNSPECIFIED HEADACHE TYPE: Primary | ICD-10-CM

## 2022-09-07 DIAGNOSIS — F41.1 ANXIETY STATE: ICD-10-CM

## 2022-09-07 PROCEDURE — 99214 OFFICE O/P EST MOD 30 MIN: CPT | Performed by: FAMILY MEDICINE

## 2022-09-07 RX ORDER — TOPIRAMATE 25 MG/1
25 TABLET, FILM COATED ORAL 2 TIMES DAILY
Qty: 60 TABLET | Refills: 3 | Status: SHIPPED | OUTPATIENT
Start: 2022-09-07 | End: 2023-02-07

## 2022-09-07 ASSESSMENT — ANXIETY QUESTIONNAIRES
GAD7 TOTAL SCORE: 1
3. WORRYING TOO MUCH ABOUT DIFFERENT THINGS: NOT AT ALL
4. TROUBLE RELAXING: NOT AT ALL
7. FEELING AFRAID AS IF SOMETHING AWFUL MIGHT HAPPEN: NOT AT ALL
8. IF YOU CHECKED OFF ANY PROBLEMS, HOW DIFFICULT HAVE THESE MADE IT FOR YOU TO DO YOUR WORK, TAKE CARE OF THINGS AT HOME, OR GET ALONG WITH OTHER PEOPLE?: SOMEWHAT DIFFICULT
GAD7 TOTAL SCORE: 1
GAD7 TOTAL SCORE: 1
2. NOT BEING ABLE TO STOP OR CONTROL WORRYING: NOT AT ALL
6. BECOMING EASILY ANNOYED OR IRRITABLE: SEVERAL DAYS
IF YOU CHECKED OFF ANY PROBLEMS ON THIS QUESTIONNAIRE, HOW DIFFICULT HAVE THESE PROBLEMS MADE IT FOR YOU TO DO YOUR WORK, TAKE CARE OF THINGS AT HOME, OR GET ALONG WITH OTHER PEOPLE: SOMEWHAT DIFFICULT
5. BEING SO RESTLESS THAT IT IS HARD TO SIT STILL: NOT AT ALL
1. FEELING NERVOUS, ANXIOUS, OR ON EDGE: NOT AT ALL
7. FEELING AFRAID AS IF SOMETHING AWFUL MIGHT HAPPEN: NOT AT ALL

## 2022-09-07 ASSESSMENT — PAIN SCALES - GENERAL: PAINLEVEL: MILD PAIN (2)

## 2022-09-07 ASSESSMENT — PATIENT HEALTH QUESTIONNAIRE - PHQ9
SUM OF ALL RESPONSES TO PHQ QUESTIONS 1-9: 3
10. IF YOU CHECKED OFF ANY PROBLEMS, HOW DIFFICULT HAVE THESE PROBLEMS MADE IT FOR YOU TO DO YOUR WORK, TAKE CARE OF THINGS AT HOME, OR GET ALONG WITH OTHER PEOPLE: SOMEWHAT DIFFICULT
SUM OF ALL RESPONSES TO PHQ QUESTIONS 1-9: 3

## 2022-09-07 NOTE — PROGRESS NOTES
Assessment/Plan:    Chronic nonintractable headache, unspecified headache type  Chronic non-intractable headaches described.  Trial of topiramate 25 mg twice daily.  Utilizing ibuprofen 600 to 800 mg on as-needed basis or acetaminophen 1000 mg on as-needed basis.  [facility] paperwork was completed today.  - topiramate (TOPAMAX) 25 MG tablet  Dispense: 60 tablet; Refill: 3    Essential hypertension  Blood pressure 138/66 on recheck and continuing use of losartan 50 mg daily.  Has scheduled follow-up November 11, 2022 with underlying history of type 2 diabetes.    Anxiety state  Paroxetine 60 mg daily for anxiety management.          Subjective:    Oneil Fuller is seen today for evaluation of chronic headaches.  Dates back to December 9, 2020 after work-related head injury as noted.  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  Attempted to pull a tray out from the  which caused the hinge gait to come down on top of his head.  No loss of consciousness.  Did go to the ground and was found and was sent home from work.    Headaches have persisted.  Perhaps 3-4 times a month that he will need to be out of work for the entire day otherwise often 3-4 episodes a week which headaches last for a couple hours up to 8 hours perhaps.  Typically helps to be able to show up for work slightly late on these days and then able to complete his work shift.  Does have history of hypertension treated with losartan 50 mg daily plus anxiety disorder managed with paroxetine 60 mg daily.  Comprehensive review of systems as above otherwise all negative.     - Cindy x 1985   3 adopted children   No smoke   EtOH: infrequent   Surgeries: embryonal carcinoma right testicle (Feb, 1978) - chemo x 18 months, orchectomy, yearssecond surgery, CXR q 5 years for monitoring (last performed 1/30/14); eye muscle surgery due to strabismus; right forehead cyst excised 12/2010; wisdom  "teeth; tonsils age 5   Hospitalizations: as above   Mom -  79 due to diabetes > 30 years with bilateral lower extremity amputation; hemodialysis x 6 years   Dad - arthritis; 3 knee replacements   1 bro - arthritis and \"heart issues\"   2 sis -   Work: unemployeed currently - h/o  ( working for [employer] in this building); 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      Past Surgical History:   Procedure Laterality Date     OTHER SURGICAL HISTORY      orchectomy     TONSILLECTOMY          Family History   Problem Relation Age of Onset     Diabetes Mother      Arthritis Father      Arthritis Brother         Past Medical History:   Diagnosis Date     Anxiety      GERD (gastroesophageal reflux disease)      Hypertension         Social History     Tobacco Use     Smoking status: Never Smoker     Smokeless tobacco: Never Used   Substance Use Topics     Alcohol use: Yes     Drug use: No        Current Outpatient Medications   Medication Sig Dispense Refill     aspirin (ASA) 81 MG EC tablet Take 1 tablet (81 mg) by mouth daily 100 tablet 3     aspirin-sod bicarb-citric acid (TRISHA-SELTZER) 324 mg TbEF [ASPIRIN-SOD BICARB-CITRIC ACID (TRISHA-SELTZER) 324 MG TBEF] Take 325 mg by mouth every 6 (six) hours as needed (takes once a day).       losartan (COZAAR) 50 MG tablet Take 1 tablet (50 mg) by mouth daily 90 tablet 3     omeprazole (PRILOSEC) 20 MG capsule [OMEPRAZOLE (PRILOSEC) 20 MG CAPSULE] Take 20 mg by mouth daily.       PARoxetine (PAXIL) 20 MG tablet [PAROXETINE (PAXIL) 20 MG TABLET] TAKE 3 TABLETS(60 MG) BY MOUTH DAILY 270 tablet 3     topiramate (TOPAMAX) 25 MG tablet Take 1 tablet (25 mg) by mouth 2 times daily 60 tablet 3          Objective:    Vitals:    22 1402 22 1429   BP: (!) 140/80 138/66   Pulse: 109    SpO2: 96%    Weight: 113.9 kg (251 lb)       Body mass index is 34.04 kg/m .    Alert.  No apparent distress.  Chest clear.  Cardiac exam regular.  Quiet " affect.  Slowed responses, baseline      This note has been dictated using voice recognition software and as a result may contain minor grammatical errors and unintended word substitutions.       Answers for HPI/ROS submitted by the patient on 9/7/2022  If you checked off any problems, how difficult have these problems made it for you to do your work, take care of things at home, or get along with other people?: Somewhat difficult  PHQ9 TOTAL SCORE: 3  Headache Symptoms are: worsened  How often are you getting headaches or migraines? : Daily  Are you able to do normal daily activities when you have a migraine?: Yes  Migraine Rescue/Relief Medications:: Ibuprofen (Advil, Motrin), Naproxyn (Aleve)  Effectiveness of rescue/relief medications:: I get some relief  Migraine Preventative Medications:: other  ER or UC Visits:: 0 times  How many servings of fruits and vegetables do you eat daily?: 4 or more  On average, how many sweetened beverages do you drink each day (Examples: soda, juice, sweet tea, etc.  Do NOT count diet or artificially sweetened beverages)?: 2  How many minutes a day do you exercise enough to make your heart beat faster?: 10 to 19  How many days a week do you exercise enough to make your heart beat faster?: 3 or less  How many days per week do you miss taking your medication?: 1  What makes it hard for you to take your medication every day?: remembering to take

## 2022-10-18 ENCOUNTER — TRANSFERRED RECORDS (OUTPATIENT)
Dept: HEALTH INFORMATION MANAGEMENT | Facility: CLINIC | Age: 62
End: 2022-10-18

## 2022-10-20 ENCOUNTER — TELEPHONE (OUTPATIENT)
Dept: FAMILY MEDICINE | Facility: CLINIC | Age: 62
End: 2022-10-20

## 2022-10-20 NOTE — TELEPHONE ENCOUNTER
Pt had colonoscopy on Tuesday, was up last night with a cough. Unsure if they're related due to the anesthesia. Wondering if he should pursue anything for the cough. I suggested OTC cough syrup and/or lozenges while he waits for provider recommendations. Requesting a call back.

## 2022-11-18 ENCOUNTER — LAB (OUTPATIENT)
Dept: LAB | Facility: CLINIC | Age: 62
End: 2022-11-18
Payer: COMMERCIAL

## 2022-11-18 DIAGNOSIS — Z00.00 HEALTH CARE MAINTENANCE: Primary | ICD-10-CM

## 2022-11-18 LAB
HBA1C MFR BLD: 6.3 % (ref 0–5.6)
HOLD SPECIMEN: NORMAL

## 2022-11-18 PROCEDURE — 36415 COLL VENOUS BLD VENIPUNCTURE: CPT

## 2022-11-18 PROCEDURE — 83036 HEMOGLOBIN GLYCOSYLATED A1C: CPT

## 2022-12-08 ENCOUNTER — OFFICE VISIT (OUTPATIENT)
Dept: DERMATOLOGY | Facility: CLINIC | Age: 62
End: 2022-12-08
Attending: FAMILY MEDICINE
Payer: COMMERCIAL

## 2022-12-08 DIAGNOSIS — D22.9 MULTIPLE BENIGN NEVI: ICD-10-CM

## 2022-12-08 DIAGNOSIS — L81.4 SOLAR LENTIGO: ICD-10-CM

## 2022-12-08 DIAGNOSIS — D18.00 HEMANGIOMA, UNSPECIFIED SITE: ICD-10-CM

## 2022-12-08 DIAGNOSIS — D18.01 CHERRY ANGIOMA: ICD-10-CM

## 2022-12-08 DIAGNOSIS — L91.8 INFLAMED SKIN TAG: Primary | ICD-10-CM

## 2022-12-08 DIAGNOSIS — L91.8 SKIN TAG: ICD-10-CM

## 2022-12-08 DIAGNOSIS — L82.1 SEBORRHEIC KERATOSIS: ICD-10-CM

## 2022-12-08 DIAGNOSIS — L72.0 EIC (EPIDERMAL INCLUSION CYST): ICD-10-CM

## 2022-12-08 PROCEDURE — 11200 RMVL SKIN TAGS UP TO&INC 15: CPT | Performed by: DERMATOLOGY

## 2022-12-08 PROCEDURE — 99203 OFFICE O/P NEW LOW 30 MIN: CPT | Mod: 25 | Performed by: DERMATOLOGY

## 2022-12-08 ASSESSMENT — PAIN SCALES - GENERAL: PAINLEVEL: NO PAIN (0)

## 2022-12-08 NOTE — NURSING NOTE
"Dermatology Rooming Note    Oneil Fuller's goals for this visit include:   Chief Complaint   Patient presents with     Skin Tags     Skin tags around \"waist band and along the sides of the scrotum\".      Derm Problem     Spots of concern on back of head,neck and armpits.     Robert Samaniego, EMT-B    "

## 2022-12-08 NOTE — LETTER
12/8/2022       RE: Oneil Fuller  [address] [address]     Dear Colleague,    Thank you for referring your patient, Oneil Fuller, to the [facility]MAT[facility] [place] at [facility]. Please see a copy of my visit note below.    [facility] Dermatology Note  Encounter Date: Dec 8, 2022  Office Visit     Dermatology Problem List:  1. EIC  2. Skin tags  ____________________________________________    Assessment & Plan:    # Inflamed skin tags, left and right axilla, left and right medial thigh.    - Cryotherapy performed today.     # EIC, left upper arm.   Explained to patient benign nature of lesion. No treatment is necessary at this time unless the lesion changes or becomes symptomatic.   - Discussed excision as option for removal; discussed risks/benefits    # Hemangioma, posterior neck.   Explained to patient benign nature of lesion. No treatment is necessary at this time unless the lesion changes or becomes symptomatic.  - Discussed option for shave removal; patient deferred    # Benign lesions: Multiple benign nevi, solar lentigos, seborrheic keratoses, cherry angiomas. Explained to patient benign nature of lesion. No treatment is necessary at this time unless the lesion changes or becomes symptomatic.   - ABCDs of melanoma were discussed and self skin checks were advised.  - Sun precaution was advised including the use of sun screens of SPF 30 or higher, sun protective clothing, and avoidance of tanning beds.    Procedures Performed:   - Cryotherapy procedure note, location(s): right axilla x5, left axilla x5, left medial thigh x1, right medial thigh x1. After verbal consent and discussion of risks and benefits including, but not limited to, dyspigmentation/scar, blister, and pain, 12 lesion(s) was(were) treated with 1-2 mm freeze border for 1-2 cycles with liquid nitrogen. Post cryotherapy instructions  "were provided.    Follow-up: 1 year(s) in-person, or earlier for new or changing lesions    Staff and Scribe:     Scribe Disclosure:  I, Nick Ashton, am serving as a scribe to document services personally performed by Yoni Floyd MD based on data collection and the provider's statements to me.     Provider Disclosure:   The documentation recorded by the scribe accurately reflects the services I personally performed and the decisions made by me.    Yoni Floyd MD    Department of Dermatology  [facility]: Phone: [phone], Fax:[phone]  [facility]: Phone: [phone] Fax: [phone]  ____________________________________________    CC: Skin Tags (Skin tags around \"waist band and along the sides of the scrotum\". ) and Derm Problem (Spots of concern on back of head,neck and armpits.)    HPI:  Mr. Oneil Fuller is a(n) 62 year old male who presents today as a new patient for spot check. Patient is concerned about skin tags around the waist, armpits, and scrotum and spots on the back, head, neck, and armpits.     He states his father had a BCC and possibly melanoma removed in the past. He has not used tanning beds in the past. He works indoors. Patient has had a growth on his nose.    Patient is otherwise feeling well, without additional skin concerns.    Labs Reviewed:  N/A    Physical Exam:  Vitals: There were no vitals taken for this visit.  SKIN: Full skin, which includes the head/face, both arms, chest, back, abdomen,both legs, genitalia and/or groin buttocks, digits and/or nails, was examined.  - Skin colored pedunculated papules on the right axilla x5, left axilla x5, left medial thigh x1, right medial thigh x1  - 6-7 mm light brown to violaceous papule on the posterior neck  - Cystic papule with central punctum on left upper arm   - There are dome shaped bright red " papules on the trunk and extremities.   - Multiple regular brown pigmented macules and papules are identified on the trunk and extremities.   - Scattered brown macules on sun exposed areas.  - There are waxy stuck on tan to brown papules on the trunk and extremities.   - No other lesions of concern on areas examined.     Medications:  Current Outpatient Medications   Medication     aspirin (ASA) 81 MG EC tablet     aspirin-sod bicarb-citric acid (TRISHA-SELTZER) 324 mg TbEF     losartan (COZAAR) 50 MG tablet     omeprazole (PRILOSEC) 20 MG capsule     PARoxetine (PAXIL) 20 MG tablet     topiramate (TOPAMAX) 25 MG tablet     No current facility-administered medications for this visit.      Past Medical History:   Patient Active Problem List   Diagnosis     Embryonal Carcinoma     Obesity     Anxiety     Hematuria     Mixed hyperlipidemia     Essential Hypertension     Esophageal Reflux     Impaired Fasting Glucose     Snoring (Symptom)     Internal Hemorrhoids     Diverticulosis     Pain In The Thigh     Capillary hemangioma     Encounter for annual general medical examination without abnormal findings in adult     Past Medical History:   Diagnosis Date     Anxiety      Cancer (H)      GERD (gastroesophageal reflux disease)      Hypertension         CC Oneil Stacy MD  [address]  [place] [address] on close of this encounter.

## 2022-12-08 NOTE — PROGRESS NOTES
[facility] Dermatology Note  Encounter Date: Dec 8, 2022  Office Visit     Dermatology Problem List:  1. EIC  2. Skin tags  ____________________________________________    Assessment & Plan:    # Inflamed skin tags, left and right axilla, left and right medial thigh.    - Cryotherapy performed today.     # EIC, left upper arm.   Explained to patient benign nature of lesion. No treatment is necessary at this time unless the lesion changes or becomes symptomatic.   - Discussed excision as option for removal; discussed risks/benefits    # Hemangioma, posterior neck.   Explained to patient benign nature of lesion. No treatment is necessary at this time unless the lesion changes or becomes symptomatic.  - Discussed option for shave removal; patient deferred    # Benign lesions: Multiple benign nevi, solar lentigos, seborrheic keratoses, cherry angiomas. Explained to patient benign nature of lesion. No treatment is necessary at this time unless the lesion changes or becomes symptomatic.   - ABCDs of melanoma were discussed and self skin checks were advised.  - Sun precaution was advised including the use of sun screens of SPF 30 or higher, sun protective clothing, and avoidance of tanning beds.    Procedures Performed:   - Cryotherapy procedure note, location(s): right axilla x5, left axilla x5, left medial thigh x1, right medial thigh x1. After verbal consent and discussion of risks and benefits including, but not limited to, dyspigmentation/scar, blister, and pain, 12 lesion(s) was(were) treated with 1-2 mm freeze border for 1-2 cycles with liquid nitrogen. Post cryotherapy instructions were provided.    Follow-up: 1 year(s) in-person, or earlier for new or changing lesions    Staff and Scribe:     Scribe Disclosure:  Nick LEMONS, am serving as a scribe to document services personally performed by Yoni Folyd MD based on data collection and the provider's statements to me.     Provider  "Disclosure:   The documentation recorded by the scribe accurately reflects the services I personally performed and the decisions made by me.    Yoni Floyd MD    Department of Dermatology  [facility]: Phone: [phone], Fax:[phone]  [facility]: Phone: [phone] Fax: [phone]  ____________________________________________    CC: Skin Tags (Skin tags around \"waist band and along the sides of the scrotum\". ) and Derm Problem (Spots of concern on back of head,neck and armpits.)    HPI:  Mr. Oneil Fuller is a(n) 62 year old male who presents today as a new patient for spot check. Patient is concerned about skin tags around the waist, armpits, and scrotum and spots on the back, head, neck, and armpits.     He states his father had a BCC and possibly melanoma removed in the past. He has not used tanning beds in the past. He works indoors. Patient has had a growth on his nose.    Patient is otherwise feeling well, without additional skin concerns.    Labs Reviewed:  N/A    Physical Exam:  Vitals: There were no vitals taken for this visit.  SKIN: Full skin, which includes the head/face, both arms, chest, back, abdomen,both legs, genitalia and/or groin buttocks, digits and/or nails, was examined.  - Skin colored pedunculated papules on the right axilla x5, left axilla x5, left medial thigh x1, right medial thigh x1  - 6-7 mm light brown to violaceous papule on the posterior neck  - Cystic papule with central punctum on left upper arm   - There are dome shaped bright red papules on the trunk and extremities.   - Multiple regular brown pigmented macules and papules are identified on the trunk and extremities.   - Scattered brown macules on sun exposed areas.  - There are waxy stuck on tan to brown papules on the trunk and extremities.   - No other lesions of concern on areas examined. "     Medications:  Current Outpatient Medications   Medication     aspirin (ASA) 81 MG EC tablet     aspirin-sod bicarb-citric acid (TRISHA-SELTZER) 324 mg TbEF     losartan (COZAAR) 50 MG tablet     omeprazole (PRILOSEC) 20 MG capsule     PARoxetine (PAXIL) 20 MG tablet     topiramate (TOPAMAX) 25 MG tablet     No current facility-administered medications for this visit.      Past Medical History:   Patient Active Problem List   Diagnosis     Embryonal Carcinoma     Obesity     Anxiety     Hematuria     Mixed hyperlipidemia     Essential Hypertension     Esophageal Reflux     Impaired Fasting Glucose     Snoring (Symptom)     Internal Hemorrhoids     Diverticulosis     Pain In The Thigh     Capillary hemangioma     Encounter for annual general medical examination without abnormal findings in adult     Past Medical History:   Diagnosis Date     Anxiety      Cancer (H)      GERD (gastroesophageal reflux disease)      Hypertension         CC Oniel Stacy MD  [address]  [address]  [place] [address] on close of this encounter.

## 2023-01-02 NOTE — PROGRESS NOTES
"In person evaluation      \[facility]\""  1/3/2023, in person consultation      62-year-old being evaluated neurologically for:  Concussion x4  Persistent daily headache  Asymmetric pupils right greater than left    A.  Persistent daily a.m. headache       Onset  2021 or so       Can be on either side       Awakens with a headache sometimes gets better after he is up and moves around       May be there later in the day         Vague about associated symptoms       Has some left lower quadrant visual changes at times       No nausea or vomiting       No specific photophobia or phonophobia         No past history of \"migraine\"       No history of childhood motion sickness         Family history positive for migraines in his mother and possibly his sister when she was a teenager            Started on topiramate 25 mg twice daily by primary June 2022        Did seem to help          No history of kidney stones stays well-hydrated no excessive paresthesias or glaucoma or abnormal taste        Tolerating low-dose medication well      B.  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  Asymmetric pupils found on exam        Right greater than left        No ptosis no ophthalmoplegia        Both have some reactivity both consensual and direct        Unclear if this is a partial Aides  pupil from trauma with past wrestling      D.  Question snoring not significant per patient's wife by his report        Does have a.m. headache though concern for sleep apnea            Past medical " history  Hyperlipidemia  Hypertension  Testicular cancer (orchiectomy age 17)  GERD/diverticulosis  Anxiety  Snoring        Habits  Does not smoke  Does not drink alcohol      Family history  Father arthritis  Mother diabetes/migraine  Brother arthritis  Sister migraines in her teenage years      Work-up  B12 435, TSH 0.98, (7/25/2016)    EEG 1/10/2023 (8-9 Hz percent rhythm)  Mildly abnormal EEG due to:  1.  Subtle rare brief 5 Hz theta disorganization left frontal temporal region  2.  Otherwise normal EEG with normal drowsiness and sleep stage I.     MRI scan brain 1/23/2023  1.  No acute intracranial process.  2.  Generalized brain atrophy and presumed microvascular ischemic.  MRA intracranial vessels 1/23/2023  1.  No high-grade stenosis or large vessel occlusion.  2.  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        Laboratory data review                          11/2018           7/2022              1/3/2023    NA/K                139/4.7            141/5.2              141/4.2    BUN/Cr            20/0.89            20.8/1.10           22.1/1.25    GLU                  117                  126                   117    AST                   24                    -----    Ca                                              10.5                  10.6    HDL/LDL          39/157              38/139    HGBA1C          6.2                        6.3                                                    (11/2022)            Exam    Review of systems see above  Pertinent positives  Some neck and back pain  Some numbness and tingling more so in his feet chronic    Headaches that are more in the morning  Does have some history of chest pain and some shortness of breath but not currently    Some snoring    Denies diplopia dysarthria dysphagia  Some left lower quadrant visual change in the left eye  No focal weakness  Gait  okay    Otherwise review systems negative see intake sheet    General exam  Blood pressure 107/70, pulse 86  Alert oriented x3  HEENT significant for right greater than left pupil  No ptosis  Lungs clear  Heart rate regular  Abdomen soft  Symmetrical pulses  No edema the feet    Neurologic exam  Alert orient x3  Normal prosody of speech  Normal naming  Normal comprehension  Normal repetition  No aphasia  No neglect  Memory and recall okay (somewhat of a roundabout historian)    Cranial nerves II through XII significant for  Right greater than left pupil both reactive  No ptosis  No ophthalmoplegia  No nystagmus  Bedside visual fields intact  Face symmetrical  Tongue twisters good    Upper extremities  No drift  No tremor  Normal rapid alternating movements    Lower extremities  Distal proximal strength good    Reflexes  Triceps 2+/2+  Biceps 2+/2+  Santiago reflex negative  Knees 2+/2+  Ankles absent  Toes downgoing    Sensory exam  Decreased vibratory sense in the feet compared to the hands  Temperature appreciation and pinprick normal in the upper and lower extremities  Light touch intact    Gait  Normal  Romberg negative        Assessment/plan    1.  Persistent a.m. headache onset 2021 or so       Some history of snoring question sleep apnea type headache    2.  History of multiple concussions at least 4 by recall different events throughout his life       Possible postconcussive type headache secondary to multiple concussions       Some family history of migraine       Started on topiramate June 2022 low-dose 25 mg twice daily did help some    3.  Asymmetric pupils right greater than left       Unsure if this is secondary to previous trauma or if could be related to aneurysm      Recommend  With rather new persistent headache and asymmetric pupils rule out aneurysm  History of multiple concussions some of them with college wrestling recommend EEG to make sure there is not a postconcussive syndrome its more  serious  Check electrolytes to decide if we can further increase topiramate    Plan  MRI head  MRA head  EEG  Electrolytes  Follow-up after the above    If above work-up negative consider increasing topiramate and following  If medication adjustments do not help consider sleep study    Reviewed previous records    Total care time today 64 minutes discussing and evaluating the above  Laboratory data 1/3/2023.  Sodium 141 potassium 4.2, BUN 22.1, creatinine 1.25  Glucose 117  Calcium 10.6 (stable)  EEG 1/10/2023 (8-9 Hz percent rhythm)  Mildly abnormal EEG due to:  1.  Subtle rare brief 5 Hz theta disorganization left frontal temporal region  2.  Otherwise normal EEG with normal drowsiness and sleep stage I.               MRI scan brain 1/23/2023  1.  No acute intracranial process.  2.  Generalized brain atrophy and presumed microvascular ischemic.  MRA intracranial vessels 1/23/2023  1.  No high-grade stenosis or large vessel occlusion.  2.  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        Follow-up visit 2/7/2023

## 2023-01-03 ENCOUNTER — LAB (OUTPATIENT)
Dept: LAB | Facility: HOSPITAL | Age: 63
End: 2023-01-03
Payer: COMMERCIAL

## 2023-01-03 ENCOUNTER — OFFICE VISIT (OUTPATIENT)
Dept: NEUROLOGY | Facility: CLINIC | Age: 63
End: 2023-01-03
Attending: FAMILY MEDICINE
Payer: COMMERCIAL

## 2023-01-03 VITALS
BODY MASS INDEX: 33.23 KG/M2 | RESPIRATION RATE: 18 BRPM | SYSTOLIC BLOOD PRESSURE: 107 MMHG | HEART RATE: 86 BPM | DIASTOLIC BLOOD PRESSURE: 70 MMHG | WEIGHT: 245 LBS

## 2023-01-03 DIAGNOSIS — H57.02 PUPIL ASYMMETRY: ICD-10-CM

## 2023-01-03 DIAGNOSIS — F07.81 POSTCONCUSSIVE SYNDROME: ICD-10-CM

## 2023-01-03 DIAGNOSIS — R51.9 NONINTRACTABLE EPISODIC HEADACHE, UNSPECIFIED HEADACHE TYPE: ICD-10-CM

## 2023-01-03 DIAGNOSIS — G44.52 NEW PERSISTENT DAILY HEADACHE: ICD-10-CM

## 2023-01-03 DIAGNOSIS — H57.02 PUPIL ASYMMETRY: Primary | ICD-10-CM

## 2023-01-03 LAB
ANION GAP SERPL CALCULATED.3IONS-SCNC: 10 MMOL/L (ref 7–15)
BUN SERPL-MCNC: 22.1 MG/DL (ref 8–23)
CALCIUM SERPL-MCNC: 10.6 MG/DL (ref 8.8–10.2)
CHLORIDE SERPL-SCNC: 107 MMOL/L (ref 98–107)
CREAT SERPL-MCNC: 1.25 MG/DL (ref 0.67–1.17)
DEPRECATED HCO3 PLAS-SCNC: 24 MMOL/L (ref 22–29)
GFR SERPL CREATININE-BSD FRML MDRD: 66 ML/MIN/1.73M2
GLUCOSE SERPL-MCNC: 117 MG/DL (ref 70–99)
POTASSIUM SERPL-SCNC: 4.2 MMOL/L (ref 3.4–5.3)
SODIUM SERPL-SCNC: 141 MMOL/L (ref 136–145)

## 2023-01-03 PROCEDURE — 36415 COLL VENOUS BLD VENIPUNCTURE: CPT

## 2023-01-03 PROCEDURE — 80048 BASIC METABOLIC PNL TOTAL CA: CPT

## 2023-01-03 PROCEDURE — 99205 OFFICE O/P NEW HI 60 MIN: CPT | Performed by: PSYCHIATRY & NEUROLOGY

## 2023-01-03 NOTE — LETTER
"    1/3/2023         RE: Oneil Fuller  [address] [address]        Dear Colleague,    Thank you for referring your patient, Oneil Fuller, to the [facility] [place]. Please see a copy of my visit note below.    In person evaluation      HPI  1/3/2023, in person consultation      62-year-old being evaluated neurologically for:  Concussion x4  Persistent daily headache  Asymmetric pupils right greater than left    A.  Persistent daily a.m. headache       Onset  2021 or so       Can be on either side       Awakens with a headache sometimes gets better after he is up and moves around       May be there later in the day         Vague about associated symptoms       Has some left lower quadrant visual changes at times       No nausea or vomiting       No specific photophobia or phonophobia         No past history of \"migraine\"       No history of childhood motion sickness         Family history positive for migraines in his mother and possibly his sister when she was a teenager            Started on topiramate 25 mg twice daily by primary June 2022        Did seem to help          No history of kidney stones stays well-hydrated no excessive paresthesias or glaucoma or abnormal taste        Tolerating low-dose medication well      B.  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  Asymmetric pupils found on exam        Right greater than left        No ptosis no ophthalmoplegia        Both have some reactivity both consensual and " direct        Unclear if this is a partial Aides  pupil from trauma with past wrestling      D.  Question snoring not significant per patient's wife by his report        Does have a.m. headache though concern for sleep apnea            Past medical history  Hyperlipidemia  Hypertension  Testicular cancer (orchiectomy age 17)  GERD/diverticulosis  Anxiety  Snoring        Habits  Does not smoke  Does not drink alcohol      Family history  Father arthritis  Mother diabetes/migraine  Brother arthritis  Sister migraines in her teenage years      Work-up  B12 435, TSH 0.98, (7/25/2016)        Laboratory data review                          11/2018 7/2022    NA/K                139/4.7            141/5.2    BUN/Cr            20/0.89            20.8/1.10    GLU                  117                  126    AST                   24                    -----    HDL/LDL          39/157              38/139    HGBA1C          6.2                        6.3                                                    (11/2022)        Exam    Review of systems see above  Pertinent positives  Some neck and back pain  Some numbness and tingling more so in his feet chronic    Headaches that are more in the morning  Does have some history of chest pain and some shortness of breath but not currently    Some snoring    Denies diplopia dysarthria dysphagia  Some left lower quadrant visual change in the left eye  No focal weakness  Gait okay    Otherwise review systems negative see intake sheet    General exam  Blood pressure 107/70, pulse 86  Alert oriented x3  HEENT significant for right greater than left pupil  No ptosis  Lungs clear  Heart rate regular  Abdomen soft  Symmetrical pulses  No edema the feet    Neurologic exam  Alert orient x3  Normal prosody of speech  Normal naming  Normal comprehension  Normal repetition  No aphasia  No neglect  Memory and recall okay (somewhat of a roundabout historian)    Cranial nerves II through XII  significant for  Right greater than left pupil both reactive  No ptosis  No ophthalmoplegia  No nystagmus  Bedside visual fields intact  Face symmetrical  Tongue twisters good    Upper extremities  No drift  No tremor  Normal rapid alternating movements    Lower extremities  Distal proximal strength good    Reflexes  Triceps 2+/2+  Biceps 2+/2+  Santiago reflex negative  Knees 2+/2+  Ankles absent  Toes downgoing    Sensory exam  Decreased vibratory sense in the feet compared to the hands  Temperature appreciation and pinprick normal in the upper and lower extremities  Light touch intact    Gait  Normal  Romberg negative        Assessment/plan    1.  Persistent a.m. headache onset 2021 or so       Some history of snoring question sleep apnea type headache    2.  History of multiple concussions at least 4 by recall different events throughout his life       Possible postconcussive type headache secondary to multiple concussions       Some family history of migraine       Started on topiramate June 2022 low-dose 25 mg twice daily did help some    3.  Asymmetric pupils right greater than left       Unsure if this is secondary to previous trauma or if could be related to aneurysm      Recommend  With rather new persistent headache and asymmetric pupils rule out aneurysm  History of multiple concussions some of them with college wrestling recommend EEG to make sure there is not a postconcussive syndrome its more serious  Check electrolytes to decide if we can further increase topiramate    Plan  MRI head  MRA head  EEG  Electrolytes  Follow-up after the above  If above work-up negative consider increasing topiramate and following  If medication adjustments do not help consider sleep study    Reviewed previous records    Total care time today 64 minutes discussing and evaluating the above.        Again, thank you for allowing me to participate in the care of your patient.        Sincerely,        Umberto Georges,  MD

## 2023-01-03 NOTE — NURSING NOTE
Chief Complaint   Patient presents with     Headache     Concussion follow up     Estela Leone MA,CMA,2:33 PM

## 2023-01-03 NOTE — LETTER
January 25, 2023      Oneil CISNEROS Fuller  [address] [facility] [address]        Dear ,    We are writing to inform you of your test results.    EEG 1/10/2023 (8-9 Hz percent rhythm)  Mildly abnormal EEG due to:  1.  Subtle rare brief 5 Hz theta disorganization left frontal temporal region  2.  Otherwise normal EEG with normal drowsiness and sleep stage I.     MRI scan brain 1/23/2023  1.  No acute intracranial process.  2.  Generalized brain atrophy and presumed microvascular ischemic.  MRA intracranial vessels 1/23/2023  1.  No high-grade stenosis or large vessel occlusion.  2.  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    MRI of the brain and MRA blood vessels are okay we will discuss further at follow-up  EEG nonspecific changes consistent with concussion   Keep follow-up visit 2/7/2023 and we will discuss and make medication adjustments.  If you have any questions or concerns, please call the clinic at the number listed above.       Sincerely,    Dr. Umberto Georges

## 2023-01-10 ENCOUNTER — ANCILLARY PROCEDURE (OUTPATIENT)
Dept: NEUROLOGY | Facility: CLINIC | Age: 63
End: 2023-01-10
Attending: PSYCHIATRY & NEUROLOGY
Payer: COMMERCIAL

## 2023-01-10 DIAGNOSIS — G44.52 NEW PERSISTENT DAILY HEADACHE: ICD-10-CM

## 2023-01-10 DIAGNOSIS — F07.81 POSTCONCUSSIVE SYNDROME: ICD-10-CM

## 2023-01-10 DIAGNOSIS — H57.02 PUPIL ASYMMETRY: ICD-10-CM

## 2023-01-11 ENCOUNTER — MEDICAL CORRESPONDENCE (OUTPATIENT)
Dept: HEALTH INFORMATION MANAGEMENT | Facility: CLINIC | Age: 63
End: 2023-01-11

## 2023-01-11 PROCEDURE — 95819 EEG AWAKE AND ASLEEP: CPT | Performed by: PSYCHIATRY & NEUROLOGY

## 2023-01-17 ENCOUNTER — MEDICAL CORRESPONDENCE (OUTPATIENT)
Dept: HEALTH INFORMATION MANAGEMENT | Facility: CLINIC | Age: 63
End: 2023-01-17

## 2023-01-18 ENCOUNTER — TELEPHONE (OUTPATIENT)
Dept: NEUROLOGY | Facility: CLINIC | Age: 63
End: 2023-01-18
Payer: COMMERCIAL

## 2023-01-18 RX ORDER — LORAZEPAM 0.5 MG/1
TABLET ORAL
Qty: 2 TABLET | Refills: 0 | Status: SHIPPED | OUTPATIENT
Start: 2023-01-18 | End: 2023-02-07

## 2023-01-18 NOTE — TELEPHONE ENCOUNTER
[facility] Call Center    Phone Message    May a detailed message be left on voicemail: yes     Reason for Call: Other: Pt called to request anti- anxiety medication for MRI.  Pt states that on 01/16/23 he was scheduled for a MRI that he was not able to complete.    Please send medication to :  [facility] #[id number] - [place], [place] - [address] AT [facility] [address]    Please call Pt back at [phone] with any question and to advise.      Action Taken: Message routed to:  Other: [facility]    Travel Screening: Not Applicable

## 2023-01-18 NOTE — TELEPHONE ENCOUNTER
Left detailed message for pt relaying Dr. Georges message below.    Dacia Espitia LPN on 1/18/2023 at 10:18 AM

## 2023-01-18 NOTE — TELEPHONE ENCOUNTER
Patient would like oral sedation sent to local pharmacy for MRI/MRA brain. If ok, please send to [facility] on file  Anna Jimenez CMA on 1/18/2023 at 9:44 AM

## 2023-01-18 NOTE — TELEPHONE ENCOUNTER
Let patient know that sedative medication was sent to pharmacy.  Patient needs a  to the MRI scan and home from the MRI scan since he is going to be sedated.  Umberto Georges MD on 1/18/2023 at 9:49 AM

## 2023-01-23 ENCOUNTER — HOSPITAL ENCOUNTER (OUTPATIENT)
Dept: MRI IMAGING | Facility: HOSPITAL | Age: 63
Discharge: HOME | End: 2023-01-23
Attending: PSYCHIATRY & NEUROLOGY
Payer: COMMERCIAL

## 2023-01-23 PROCEDURE — 70551 MRI BRAIN STEM W/O DYE: CPT

## 2023-01-23 PROCEDURE — 70544 MR ANGIOGRAPHY HEAD W/O DYE: CPT

## 2023-02-06 NOTE — PROGRESS NOTES
In person evaluation      [facility]  1/3/2023, in person consultation  2/7/2023, in person visit        62-year-old being evaluated neurologically for: Left  Concussion x4  Persistent daily headache  Asymmetric pupils right greater than left      Patient with fairly persistent chronic daily headache  Worse in the morning than in the evening  Occurred are exacerbated after concussion December 2020 after getting hit in the head at work.  No loss of conscious at the event         Previous concussions prior to this        Previously had a lot of neck pain from concussions that triggered headaches.         And they were better after chiropractic treatment for years in the past              Patient gets FMLA forms filled out by primary and she continue follow-up with primary for any work restrictions        Has had work restrictions and FMLA over the last 2 years             Discussed concerned that with the headache being worse in the morning.          Question whether there is a component of sleep apnea that could be exacerbating the headache and making it difficult to control.          Had some response to Topamax low-dose          EEG had normal background rhythms but some rare theta           which can be seen with patients that are overtired sleep deprived or with previous concussion          Supraclinoid ICA infundibulum 2 mm could reflect the left posterior communicating artery origin             MRI of the head showed some small vessel ischemic changes nonspecific         MRA showed a left Supraclinoid ICA infundibulum 2 mm could reflect the left posterior communicating artery origin      Current plan  1.  Increase Topamax, 25 mg tablet, 2 tabs p.o. twice daily    2.  Highly recommend formal sleep study to make sure there is not con founding medical condition that prevents improvement.    3.  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    4.  Work restrictions/FMLA forms per patient's primary MD who has been filling these out in the past       Patient had a lot of questions about personal injury 's and the representation.       He should do what he needs to do to have proper representation I am currently seeing the patient greater than 2 years from the event.       And with the patient with multiple concussions in the past difficulty know if all the symptoms would be from this 1 event.          A.  Persistent daily a.m. headache       Onset  2021 or so       Can be on either side       Awakens with a headache sometimes gets better after he is up and moves around       May be there later in the day         Vague about associated symptoms       Has some left lower quadrant visual changes at times       No nausea or vomiting       No specific photophobia or phonophobia         No past history of \"migraine\"       No history of childhood motion sickness         Family history positive for migraines in his mother and possibly his sister when she was a teenager            Started on topiramate 25 mg twice daily by primary June 2022        Did seem to help          No history of kidney stones stays well-hydrated no excessive paresthesias or glaucoma or abnormal taste        Tolerating low-dose medication well      B.  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  Subtle rare brief 5 Hz theta disorganization left frontal temporal region          2.  Otherwise normal EEG with normal drowsiness and sleep stage I.      C.  Asymmetric pupils found on exam        Right greater than left        No ptosis no ophthalmoplegia        Both have some reactivity both consensual and direct        Unclear if this is a partial Aides  pupil from trauma with past wrestling              MRI scan brain 1/23/2023          1.  No acute intracranial process.          2.  Generalized brain atrophy and presumed microvascular ischemic.          MRA intracranial vessels 1/23/2023          1.  No high-grade stenosis or large vessel occlusion.          2.  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      D.  Question snoring not significant per patient's wife by his report        Does have a.m. headache though concern for sleep apnea           EEG 1/10/2023 (8-9 Hz percent rhythm)         Mildly abnormal EEG due to:         1.  Subtle rare brief 5 Hz theta disorganization left frontal temporal region          2.  Otherwise normal EEG with normal drowsiness and sleep stage I.           Past medical history  Hyperlipidemia  Hypertension  Testicular cancer (orchiectomy age 17)  GERD/diverticulosis  Anxiety  Snoring        Habits  Does not smoke  Does not drink alcohol      Family history  Father arthritis  Mother diabetes/migraine  Brother arthritis  Sister migraines in her teenage years      Work-up  B12 435, TSH 0.98, (7/25/2016)  EEG 1/10/2023 (8-9 Hz percent rhythm)  Mildly abnormal EEG due to:  1.  Subtle rare brief 5 Hz theta disorganization left frontal temporal region  2.  Otherwise normal EEG with normal drowsiness and sleep stage I.  MRI scan brain 1/23/2023  1.  No acute intracranial  process.  2.  Generalized brain atrophy and presumed microvascular ischemic.  MRA intracranial vessels 1/23/2023  1.  No high-grade stenosis or large vessel occlusion.  2.  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        Laboratory data review                          11/2018           7/2022              1/3/2023    NA/K                139/4.7            141/5.2              141/4.2    BUN/Cr            20/0.89            20.8/1.10           22.1/1.25    GLU                  117                  126                   117    AST                   24                    -----    Ca                                              10.5                  10.6    HDL/LDL          39/157              38/139    HGBA1C          6.2                        6.3                                                    (11/2022)            Exam    Review of systems see above  Pertinent positives  Some neck and back pain  Some numbness and tingling more so in his feet chronic    Headaches that are more in the morning  Does have some history of chest pain and some shortness of breath but not currently    Some snoring    Denies diplopia dysarthria dysphagia  Some left lower quadrant visual change in the left eye  No focal weakness  Gait okay    Otherwise review systems negative see intake sheet    General exam  Blood pressure 120/72, pulse 84  Alert oriented x3  HEENT significant for right greater than left pupil  No ptosis  Lungs clear  Heart rate regular  Abdomen soft  Symmetrical pulses  No edema the feet    Neurologic exam  Alert orient x3  Normal prosody of speech  Normal naming  Normal comprehension  Normal repetition  No aphasia  No neglect  Memory and recall okay (somewhat of a roundabout historian)    Cranial nerves II through XII significant for  Right greater than left pupil both reactive  No ptosis  No ophthalmoplegia  No  nystagmus  Bedside visual fields intact  Face symmetrical  Tongue twisters good    Upper extremities  No drift  No tremor  Normal rapid alternating movements    Lower extremities  Distal proximal strength good    Reflexes  Triceps 2+/2+  Biceps 2+/2+  Santiago reflex negative  Knees 2+/2+  Ankles absent  Toes downgoing    Sensory exam  Decreased vibratory sense in the feet compared to the hands  Temperature appreciation and pinprick normal in the upper and lower extremities  Light touch intact    Gait  Normal  Romberg negative        Assessment/plan    1.  Persistent a.m. headache onset 2021 or so       Some history of snoring question sleep apnea type headache    2.  History of multiple concussions at least 4 by recall different events throughout his life       Possible postconcussive type headache secondary to multiple concussions       Some family history of migraine       Started on topiramate June 2022 low-dose 25 mg twice daily did help some    3.  Asymmetric pupils right greater than left       Unsure if this is secondary to previous trauma or if could be related to aneurysm          MRA intracranial vessels 1/23/2023          1.  No high-grade stenosis or large vessel occlusion.          2.  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    4.   Mild small vessel ischemic changes on MRI scan        MRI scan brain 1/23/2023        1.  No acute intracranial process.        2.  Generalized brain atrophy and presumed microvascular ischemic.        History of hypertension/hyperlipidemia        Risk factor reduction per primary MD on blood pressure medication        Diagnosis  Persistent daily headache  Postconcussive disorder  Snoring question sleep apnea  Left supraclinoid infundibula  Multiple concussive/multi concussive syndrome        Current plan  1.  Increase Topamax, 25 mg tablet, 2 tabs p.o. twice daily    2.  Highly  recommend formal sleep study to make sure there is not con founding medical condition that prevents improvement.    3.  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    4.  Work restrictions/FMLA forms per patient's primary MD who has been filling these out in the past       Patient had a lot of questions about personal injury 's and their representation.       He should do what he needs to do to have proper representation I am currently seeing the patient greater than 2 years from the event.       And with the patient with multiple concussions in the past difficulty to know if all the symptoms would be from this 1 event.      Reviewed multiple studies and issues as above.  Patient will follow-up in 4 months  Total care time 34 minutes discussing multiple issues diagnosis and recommendations as above.

## 2023-02-07 ENCOUNTER — OFFICE VISIT (OUTPATIENT)
Dept: NEUROLOGY | Facility: CLINIC | Age: 63
End: 2023-02-07
Payer: COMMERCIAL

## 2023-02-07 VITALS
HEART RATE: 84 BPM | BODY MASS INDEX: 33.18 KG/M2 | WEIGHT: 245 LBS | HEIGHT: 72 IN | DIASTOLIC BLOOD PRESSURE: 72 MMHG | SYSTOLIC BLOOD PRESSURE: 120 MMHG

## 2023-02-07 DIAGNOSIS — I67.1 NONRUPTURED CEREBRAL ANEURYSM: ICD-10-CM

## 2023-02-07 DIAGNOSIS — H57.02 PUPIL ASYMMETRY: ICD-10-CM

## 2023-02-07 DIAGNOSIS — R51.9 CHRONIC NONINTRACTABLE HEADACHE, UNSPECIFIED HEADACHE TYPE: ICD-10-CM

## 2023-02-07 DIAGNOSIS — G44.52 NEW PERSISTENT DAILY HEADACHE: Primary | ICD-10-CM

## 2023-02-07 DIAGNOSIS — G89.29 CHRONIC NONINTRACTABLE HEADACHE, UNSPECIFIED HEADACHE TYPE: ICD-10-CM

## 2023-02-07 DIAGNOSIS — F07.81 POSTCONCUSSIVE SYNDROME: ICD-10-CM

## 2023-02-07 PROCEDURE — 99214 OFFICE O/P EST MOD 30 MIN: CPT | Performed by: PSYCHIATRY & NEUROLOGY

## 2023-02-07 RX ORDER — TOPIRAMATE 25 MG/1
50 TABLET, FILM COATED ORAL 2 TIMES DAILY
Qty: 120 TABLET | Refills: 11 | Status: SHIPPED | OUTPATIENT
Start: 2023-02-07 | End: 2023-02-08

## 2023-02-07 RX ORDER — FLUTICASONE PROPIONATE 50 MCG
1 SPRAY, SUSPENSION (ML) NASAL PRN
COMMUNITY

## 2023-02-07 RX ORDER — NAPROXEN SODIUM 220 MG
220 TABLET ORAL PRN
COMMUNITY

## 2023-02-07 NOTE — NURSING NOTE
Chief Complaint   Patient presents with     Results     Discuss lab and MRI/MRA results     Headache     Missed work yesterday due to a headache. Still has headache today, off and on, mainly on the top of his head.       Dacia Espitia LPN on 2/7/2023 at 2:55 PM

## 2023-02-07 NOTE — LETTER
2/7/2023         RE: Oneil Fuller  [address] [facility] [id number]        Dear Colleague,    Thank you for referring your patient, Oneil Fuller, to the [facility] [place]. Please see a copy of my visit note below.    In person evaluation      HPI  1/3/2023, in person consultation  2/7/2023, in person visit        62-year-old being evaluated neurologically for: Left  Concussion x4  Persistent daily headache  Asymmetric pupils right greater than left      Patient with fairly persistent chronic daily headache  Worse in the morning than in the evening  Occurred are exacerbated after concussion December 2020 after getting hit in the head at work.  No loss of conscious at the event         Previous concussions prior to this        Previously had a lot of neck pain from concussions that triggered headaches.         And they were better after chiropractic treatment for years in the past              Patient gets FMLA forms filled out by primary and she continue follow-up with primary for any work restrictions        Has had work restrictions and FMLA over the last 2 years             Discussed concerned that with the headache being worse in the morning.          Question whether there is a component of sleep apnea that could be exacerbating the headache and making it difficult to control.          Had some response to Topamax low-dose          EEG had normal background rhythms but some rare theta           which can be seen with patients that are overtired sleep deprived or with previous concussion          Supraclinoid ICA infundibulum 2 mm could reflect the left posterior communicating artery origin             MRI of the head showed some small vessel ischemic changes nonspecific         MRA showed a left Supraclinoid ICA infundibulum 2 mm could reflect the left posterior communicating artery origin      Current plan  1.  Increase Topamax, 25 mg tablet, 2 tabs p.o. twice  "daily    2.  Highly recommend formal sleep study to make sure there is not con founding medical condition that prevents improvement.    3.  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    4.  Work restrictions/FMLA forms per patient's primary MD who has been filling these out in the past       Patient had a lot of questions about personal injury 's and the representation.       He should do what he needs to do to have proper representation I am currently seeing the patient greater than 2 years from the event.       And with the patient with multiple concussions in the past difficulty know if all the symptoms would be from this 1 event.          A.  Persistent daily a.m. headache       Onset  2021 or so       Can be on either side       Awakens with a headache sometimes gets better after he is up and moves around       May be there later in the day         Vague about associated symptoms       Has some left lower quadrant visual changes at times       No nausea or vomiting       No specific photophobia or phonophobia         No past history of \"migraine\"       No history of childhood motion sickness         Family history positive for migraines in his mother and possibly his sister when she was a teenager            Started on topiramate 25 mg twice daily by primary June 2022        Did seem to help          No history of kidney stones stays well-hydrated no excessive paresthesias or glaucoma or abnormal taste        Tolerating low-dose medication well      B.  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  Subtle rare brief 5 Hz theta disorganization left frontal temporal region          2.  Otherwise normal EEG with normal drowsiness and sleep stage I.      C.  Asymmetric pupils found on exam        Right greater than left        No ptosis no ophthalmoplegia        Both have some reactivity both consensual and direct        Unclear if this is a partial Aides  pupil from trauma with past wrestling              MRI scan brain 1/23/2023          1.  No acute intracranial process.          2.  Generalized brain atrophy and presumed microvascular ischemic.          MRA intracranial vessels 1/23/2023          1.  No high-grade stenosis or large vessel occlusion.          2.  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      D.  Question snoring not significant per patient's wife by his report        Does have a.m. headache though concern for sleep apnea           EEG 1/10/2023 (8-9 Hz percent rhythm)         Mildly abnormal EEG due to:         1.  Subtle rare brief 5 Hz theta disorganization left frontal temporal region          2.  Otherwise normal EEG with normal drowsiness and sleep stage I.           Past medical history  Hyperlipidemia  Hypertension  Testicular cancer (orchiectomy age 17)  GERD/diverticulosis  Anxiety  Snoring        Habits  Does not smoke  Does not drink alcohol      Family history  Father arthritis  Mother diabetes/migraine  Brother arthritis  Sister migraines in her teenage years      Work-up  B12 435, TSH 0.98, (7/25/2016)  EEG 1/10/2023  (8-9 Hz percent rhythm)  Mildly abnormal EEG due to:  1.  Subtle rare brief 5 Hz theta disorganization left frontal temporal region  2.  Otherwise normal EEG with normal drowsiness and sleep stage I.  MRI scan brain 1/23/2023  1.  No acute intracranial process.  2.  Generalized brain atrophy and presumed microvascular ischemic.  MRA intracranial vessels 1/23/2023  1.  No high-grade stenosis or large vessel occlusion.  2.  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        Laboratory data review                          11/2018           7/2022              1/3/2023    NA/K                139/4.7            141/5.2              141/4.2    BUN/Cr            20/0.89            20.8/1.10           22.1/1.25    GLU                  117                  126                   117    AST                   24                    -----    Ca                                              10.5                  10.6    HDL/LDL          39/157              38/139    HGBA1C          6.2                        6.3                                                    (11/2022)            Exam    Review of systems see above  Pertinent positives  Some neck and back pain  Some numbness and tingling more so in his feet chronic    Headaches that are more in the morning  Does have some history of chest pain and some shortness of breath but not currently    Some snoring    Denies diplopia dysarthria dysphagia  Some left lower quadrant visual change in the left eye  No focal weakness  Gait okay    Otherwise review systems negative see intake sheet    General exam  Blood pressure 120/72, pulse 84  Alert oriented x3  HEENT significant for right greater than left pupil  No ptosis  Lungs clear  Heart rate regular  Abdomen soft  Symmetrical pulses  No edema the feet    Neurologic exam  Alert orient x3  Normal prosody of speech  Normal naming  Normal  comprehension  Normal repetition  No aphasia  No neglect  Memory and recall okay (somewhat of a roundabout historian)    Cranial nerves II through XII significant for  Right greater than left pupil both reactive  No ptosis  No ophthalmoplegia  No nystagmus  Bedside visual fields intact  Face symmetrical  Tongue twisters good    Upper extremities  No drift  No tremor  Normal rapid alternating movements    Lower extremities  Distal proximal strength good    Reflexes  Triceps 2+/2+  Biceps 2+/2+  Santiago reflex negative  Knees 2+/2+  Ankles absent  Toes downgoing    Sensory exam  Decreased vibratory sense in the feet compared to the hands  Temperature appreciation and pinprick normal in the upper and lower extremities  Light touch intact    Gait  Normal  Romberg negative        Assessment/plan    1.  Persistent a.m. headache onset 2021 or so       Some history of snoring question sleep apnea type headache    2.  History of multiple concussions at least 4 by recall different events throughout his life       Possible postconcussive type headache secondary to multiple concussions       Some family history of migraine       Started on topiramate June 2022 low-dose 25 mg twice daily did help some    3.  Asymmetric pupils right greater than left       Unsure if this is secondary to previous trauma or if could be related to aneurysm          MRA intracranial vessels 1/23/2023          1.  No high-grade stenosis or large vessel occlusion.          2.  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    4.   Mild small vessel ischemic changes on MRI scan        MRI scan brain 1/23/2023        1.  No acute intracranial process.        2.  Generalized brain atrophy and presumed microvascular ischemic.        History of hypertension/hyperlipidemia        Risk factor reduction per primary MD on blood pressure medication        Diagnosis  Persistent  daily headache  Postconcussive disorder  Snoring question sleep apnea  Left supraclinoid infundibula  Multiple concussive/multi concussive syndrome        Current plan  1.  Increase Topamax, 25 mg tablet, 2 tabs p.o. twice daily    2.  Highly recommend formal sleep study to make sure there is not con founding medical condition that prevents improvement.    3.  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    4.  Work restrictions/FMLA forms per patient's primary MD who has been filling these out in the past       Patient had a lot of questions about personal injury 's and their representation.       He should do what he needs to do to have proper representation I am currently seeing the patient greater than 2 years from the event.       And with the patient with multiple concussions in the past difficulty to know if all the symptoms would be from this 1 event.      Reviewed multiple studies and issues as above.  Patient will follow-up in 4 months  Total care time 34 minutes discussing multiple issues diagnosis and recommendations as above.          Again, thank you for allowing me to participate in the care of your patient.        Sincerely,        Umberto Georges MD

## 2023-02-08 ENCOUNTER — OFFICE VISIT (OUTPATIENT)
Dept: FAMILY MEDICINE | Facility: CLINIC | Age: 63
End: 2023-02-08
Payer: COMMERCIAL

## 2023-02-08 VITALS
SYSTOLIC BLOOD PRESSURE: 120 MMHG | DIASTOLIC BLOOD PRESSURE: 72 MMHG | HEART RATE: 110 BPM | OXYGEN SATURATION: 98 % | BODY MASS INDEX: 34.72 KG/M2 | RESPIRATION RATE: 19 BRPM | WEIGHT: 256 LBS

## 2023-02-08 DIAGNOSIS — R51.9 CHRONIC NONINTRACTABLE HEADACHE, UNSPECIFIED HEADACHE TYPE: ICD-10-CM

## 2023-02-08 DIAGNOSIS — F41.1 ANXIETY STATE: ICD-10-CM

## 2023-02-08 DIAGNOSIS — I10 ESSENTIAL HYPERTENSION: ICD-10-CM

## 2023-02-08 DIAGNOSIS — R51.9 NONINTRACTABLE EPISODIC HEADACHE, UNSPECIFIED HEADACHE TYPE: Primary | ICD-10-CM

## 2023-02-08 DIAGNOSIS — G89.29 CHRONIC NONINTRACTABLE HEADACHE, UNSPECIFIED HEADACHE TYPE: ICD-10-CM

## 2023-02-08 DIAGNOSIS — S06.0XAD CONCUSSION WITH UNKNOWN LOSS OF CONSCIOUSNESS STATUS, SUBSEQUENT ENCOUNTER: ICD-10-CM

## 2023-02-08 DIAGNOSIS — H57.02 ANISOCORIA: ICD-10-CM

## 2023-02-08 PROCEDURE — 99214 OFFICE O/P EST MOD 30 MIN: CPT | Performed by: FAMILY MEDICINE

## 2023-02-08 RX ORDER — TOPIRAMATE 25 MG/1
50 TABLET, FILM COATED ORAL 2 TIMES DAILY
Qty: 360 TABLET | Refills: 3 | Status: SHIPPED | OUTPATIENT
Start: 2023-02-08 | End: 2023-06-13

## 2023-02-08 NOTE — PROGRESS NOTES
Assessment/Plan:    Nonintractable episodic headache, unspecified headache type  Non-intractable persistent daily headaches.  Had been seen by Dr. Georges neurologist recently with increase of Topamax to 25 mg using 2 tablets twice daily.  [facility] paperwork was completed today for intermittent absences as well due to condition.  Patient is scheduled for sleep study upcoming to ensure no evidence for obstructive sleep apnea.  Reviewed prior EEG study showing occasional rare theta waves which could represent sleep deprivation or prior concussion.    Concussion with unknown loss of consciousness status, subsequent encounter  Concussion history reviewed.  Reviewed office note from September 7, 2022 with closed head injury described December 9, 2020 as outlined below.  Continuing to work with neurologist regarding postconcussion symptoms.    Anisocoria  Describe mild right greater than left pupil asymmetry.  Recent MRI reviewed as noted.    30 minutes total time spent on the date of encounter including patient chart review, counseling and coordination of cares, and documentation.       Subjective:    Oneil Fuller is seen today for follow-up assessment.  Described daily persistent headaches.  Now utilizing Topamax 25 mg increased to 2 tablets twice daily by Dr. Georges neurologist.  Patient has had about 4 concussions in his life.  Reviewed prior closed head injury from December 9, 2020 while attempting to pull a tray out from a  unit causing the hands gait to come down on top of his head.  No loss of consciousness.  Patient previously having episodes of absence from work perhaps 3-4 times a month however describes increased frequency currently.  Patient was to have sleep study to ensure no evidence for obstructive sleep apnea that delaying his ability to heal.  Anisocoria noted by neurologist as well.  Denies other recent illness.  History of hypertension treated with losartan 50 mg daily.  Continue to use as  "well of paroxetine 60 mg daily for anxiety management.  Follow-up MRA of the head in 1 year to track the left supraclinoid ICA infundibula.  Anisocoria with larger pupil on the right in the past not as evident today.  Anisocoria may have been from previous head trauma.  Comprehensive review of systems as above otherwise all negative.          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, orchectomy, yearssecond surgery, CXR q 5 years for monitoring (last performed 14); eye muscle surgery due to strabismus; right forehead cyst excised 2010; wisdom teeth; tonsils age 5   Hospitalizations: as above   Mom -  79 due to diabetes > 30 years with bilateral lower extremity amputation; hemodialysis x 6 years   Dad -  91 old age;  arthritis; 3 knee replacements   1 bro - arthritis and \"heart issues\"   2 sis -   Work: [employer] currently - h/o  ( working for [id number] in this [employer]); 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    Past Surgical History:   Procedure Laterality Date     OTHER SURGICAL HISTORY      orchectomy     TONSILLECTOMY          Family History   Problem Relation Age of Onset     Diabetes Mother      Skin Cancer Father      Arthritis Father      Arthritis Brother         Past Medical History:   Diagnosis Date     Anxiety      Cancer (H)      GERD (gastroesophageal reflux disease)      Hypertension         Social History     Tobacco Use     Smoking status: Never     Smokeless tobacco: Never   Substance Use Topics     Alcohol use: Yes     Comment: rare     Drug use: No        Current Outpatient Medications   Medication Sig Dispense Refill     aspirin-sod bicarb-citric acid (TRISHA-SELTZER) 324 mg TbEF [ASPIRIN-SOD BICARB-CITRIC ACID (TRISHA-SELTZER) 324 MG TBEF] Take 325 mg by mouth every 6 (six) hours as needed (takes once a day).       " fluticasone (FLONASE) 50 MCG/ACT nasal spray Spray 1 spray into both nostrils as needed for rhinitis or allergies       losartan (COZAAR) 50 MG tablet Take 1 tablet (50 mg) by mouth daily 90 tablet 3     naproxen sodium (ANAPROX) 220 MG tablet Take 220 mg by mouth as needed for moderate pain (4-6)       omeprazole (PRILOSEC) 20 MG capsule [OMEPRAZOLE (PRILOSEC) 20 MG CAPSULE] Take 20 mg by mouth daily.       PARoxetine (PAXIL) 20 MG tablet [PAROXETINE (PAXIL) 20 MG TABLET] TAKE 3 TABLETS(60 MG) BY MOUTH DAILY 270 tablet 3     topiramate (TOPAMAX) 25 MG tablet Take 2 tablets (50 mg) by mouth 2 times daily 360 tablet 3          Objective:    Vitals:    02/08/23 1348   BP: 120/72   Pulse: 110   Resp: 19   SpO2: 98%   Weight: 116.1 kg (256 lb)      Body mass index is 34.72 kg/m .    Alert.  Seems hard to focus and concentrate.  Cooperative otherwise and forthcoming.  Well-groomed.  Slow response time.  Mild right pupil dilation greater than left.  Cranial nerves otherwise appear intact.  Cardiac exam regular.  Extremities warm and dry.      This note has been dictated using voice recognition software and as a result may contain minor grammatical errors and unintended word substitutions.       Answers for HPI/ROS submitted by the patient on 2/7/2023  Headache Symptoms are: no change  How often are you getting headaches or migraines? : Daily  Are you able to do normal daily activities when you have a migraine?: Yes  Migraine Rescue/Relief Medications:: Naproxyn (Aleve)  Effectiveness of rescue/relief medications:: I get some relief  Migraine Preventative Medications:: other  ER or UC Visits:: 0 times  How many servings of fruits and vegetables do you eat daily?: 4 or more  On average, how many sweetened beverages do you drink each day (Examples: soda, juice, sweet tea, etc.  Do NOT count diet or artificially sweetened beverages)?: 1  How many minutes a day do you exercise enough to make your heart beat faster?: 9 or  less  How many days a week do you exercise enough to make your heart beat faster?: 3 or less  How many days per week do you miss taking your medication?: 2  What makes it hard for you to take your medication every day?: other

## 2023-02-08 NOTE — TELEPHONE ENCOUNTER
Refill request for: topiramate 25mg-request for 90 day supply instead of 30 day  Directions:Take 2 tablets (50 mg) by mouth 2 times daily      LOV: 02/07/23  NOV: 06/13/23    90 day supply with 3 refills Medication T'd for review and signature    Dacia Espitia LPN on 2/8/2023 at 8:56 AM

## 2023-04-04 ENCOUNTER — OFFICE VISIT (OUTPATIENT)
Dept: FAMILY MEDICINE | Facility: CLINIC | Age: 63
End: 2023-04-04
Payer: OTHER MISCELLANEOUS

## 2023-04-04 VITALS
SYSTOLIC BLOOD PRESSURE: 124 MMHG | BODY MASS INDEX: 34.72 KG/M2 | OXYGEN SATURATION: 98 % | RESPIRATION RATE: 20 BRPM | TEMPERATURE: 97.6 F | DIASTOLIC BLOOD PRESSURE: 82 MMHG | WEIGHT: 256 LBS | HEART RATE: 95 BPM

## 2023-04-04 DIAGNOSIS — S06.0XAD CONCUSSION WITH UNKNOWN LOSS OF CONSCIOUSNESS STATUS, SUBSEQUENT ENCOUNTER: ICD-10-CM

## 2023-04-04 DIAGNOSIS — F41.1 ANXIETY STATE: ICD-10-CM

## 2023-04-04 DIAGNOSIS — C80.1 EMBRYONAL CARCINOMA (H): ICD-10-CM

## 2023-04-04 DIAGNOSIS — E11.9 TYPE 2 DIABETES MELLITUS WITHOUT COMPLICATION, WITHOUT LONG-TERM CURRENT USE OF INSULIN (H): ICD-10-CM

## 2023-04-04 DIAGNOSIS — I10 ESSENTIAL HYPERTENSION: ICD-10-CM

## 2023-04-04 DIAGNOSIS — R51.9 CHRONIC NONINTRACTABLE HEADACHE, UNSPECIFIED HEADACHE TYPE: Primary | ICD-10-CM

## 2023-04-04 DIAGNOSIS — G89.29 CHRONIC NONINTRACTABLE HEADACHE, UNSPECIFIED HEADACHE TYPE: Primary | ICD-10-CM

## 2023-04-04 DIAGNOSIS — E83.52 HYPERCALCEMIA: ICD-10-CM

## 2023-04-04 PROCEDURE — 99214 OFFICE O/P EST MOD 30 MIN: CPT | Performed by: FAMILY MEDICINE

## 2023-04-23 ENCOUNTER — HEALTH MAINTENANCE LETTER (OUTPATIENT)
Age: 63
End: 2023-04-23

## 2023-05-07 ASSESSMENT — SLEEP AND FATIGUE QUESTIONNAIRES
HOW LIKELY ARE YOU TO NOD OFF OR FALL ASLEEP WHILE LYING DOWN TO REST IN THE AFTERNOON WHEN CIRCUMSTANCES PERMIT: HIGH CHANCE OF DOZING
HOW LIKELY ARE YOU TO NOD OFF OR FALL ASLEEP WHILE SITTING INACTIVE IN A PUBLIC PLACE: WOULD NEVER DOZE
HOW LIKELY ARE YOU TO NOD OFF OR FALL ASLEEP WHEN YOU ARE A PASSENGER IN A CAR FOR AN HOUR WITHOUT A BREAK: HIGH CHANCE OF DOZING
HOW LIKELY ARE YOU TO NOD OFF OR FALL ASLEEP WHILE SITTING AND TALKING TO SOMEONE: WOULD NEVER DOZE
HOW LIKELY ARE YOU TO NOD OFF OR FALL ASLEEP WHILE WATCHING TV: MODERATE CHANCE OF DOZING
HOW LIKELY ARE YOU TO NOD OFF OR FALL ASLEEP WHILE SITTING AND READING: MODERATE CHANCE OF DOZING
HOW LIKELY ARE YOU TO NOD OFF OR FALL ASLEEP WHILE SITTING QUIETLY AFTER LUNCH WITHOUT ALCOHOL: WOULD NEVER DOZE
HOW LIKELY ARE YOU TO NOD OFF OR FALL ASLEEP IN A CAR, WHILE STOPPED FOR A FEW MINUTES IN TRAFFIC: WOULD NEVER DOZE

## 2023-05-08 ENCOUNTER — VIRTUAL VISIT (OUTPATIENT)
Dept: SLEEP MEDICINE | Facility: CLINIC | Age: 63
End: 2023-05-08
Attending: PSYCHIATRY & NEUROLOGY
Payer: COMMERCIAL

## 2023-05-08 VITALS — WEIGHT: 255 LBS | BODY MASS INDEX: 34.54 KG/M2 | HEIGHT: 72 IN

## 2023-05-08 DIAGNOSIS — F41.1 ANXIETY STATE: ICD-10-CM

## 2023-05-08 DIAGNOSIS — R51.9 MORNING HEADACHE: ICD-10-CM

## 2023-05-08 DIAGNOSIS — E66.09 CLASS 1 OBESITY DUE TO EXCESS CALORIES WITH SERIOUS COMORBIDITY AND BODY MASS INDEX (BMI) OF 34.0 TO 34.9 IN ADULT: ICD-10-CM

## 2023-05-08 DIAGNOSIS — R40.0 DAYTIME SLEEPINESS: ICD-10-CM

## 2023-05-08 DIAGNOSIS — R06.83 SNORING: Primary | ICD-10-CM

## 2023-05-08 DIAGNOSIS — I10 ESSENTIAL HYPERTENSION: ICD-10-CM

## 2023-05-08 DIAGNOSIS — E66.811 CLASS 1 OBESITY DUE TO EXCESS CALORIES WITH SERIOUS COMORBIDITY AND BODY MASS INDEX (BMI) OF 34.0 TO 34.9 IN ADULT: ICD-10-CM

## 2023-05-08 PROCEDURE — 99204 OFFICE O/P NEW MOD 45 MIN: CPT | Mod: VID | Performed by: PHYSICIAN ASSISTANT

## 2023-05-08 ASSESSMENT — PAIN SCALES - GENERAL: PAINLEVEL: NO PAIN (0)

## 2023-05-08 NOTE — PATIENT INSTRUCTIONS
"      MY TREATMENT INFORMATION FOR SLEEP APNEA-  Oneil AMELIA Fuller    Am I having a home sleep study?  --->Watch the video for the device you are using:    -/drop off device-   https://www.Soukboard.com/watch?v=yGGFBdELGhk    Frequently asked questions:  1. What is Obstructive Sleep Apnea (MARCELLE)? MARCELLE is the most common type of sleep apnea. Apnea means, \"without breath.\"  Apnea is most often caused by narrowing or collapse of the upper airway as muscles relax during sleep.   Almost everyone has occasional apneas. Most people with sleep apnea have had brief interruptions at night frequently for many years.  The severity of sleep apnea is related to how frequent and severe the events are.   2. What are the consequences of MARCELLE? Symptoms include: feeling sleepy during the day, snoring loudly, gasping or stopping of breathing, trouble sleeping, and occasionally morning headaches or heartburn at night.  Sleepiness can be serious and even increase the risk of falling asleep while driving. Other health consequences may include development of high blood pressure and other cardiovascular disease in persons who are susceptible. Untreated MARCELLE  can contribute to heart disease, stroke and diabetes.   3. What are the treatment options? In most situations, sleep apnea is a lifelong disease that must be managed with daily therapy. Medications are not effective for sleep apnea and surgery is generally not considered until other therapies have been tried. Your treatment is your choice . Continuous Positive Airway (CPAP) works right away and is the therapy that is effective in nearly everyone. An oral device to hold your jaw forward is usually the next most reliable option. Other options include postioning devices (to keep you off your back), weight loss, and surgery including a tongue pacing device. There is more detail about some of these options below.  4. Are my sleep studies covered by insurance? Although we will request " verification of coverage, we advise you also check in advance of the study to ensure there is coverage.    Important tips for those choosing CPAP and similar devices   Know your equipment:  CPAP is continuous positive airway pressure that prevents obstructive sleep apnea by keeping the throat from collapsing while you are sleeping. In most cases, the device is  smart  and can slowly self-adjusts if your throat collapses and keeps a record every day of how well you are treated-this information is available to you and your care team.  BPAP is bilevel positive airway pressure that keeps your throat open and also assists each breath with a pressure boost to maintain adequate breathing.  Special kinds of BPAP are used in patients who have inadequate breathing from lung or heart disease. In most cases, the device is  smart  and can slowly self-adjusts to assist breathing. Like CPAP, the device keeps a record of how well you are treated.  Your mask is your connection to the device. You get to choose what feels most comfortable and the staff will help to make sure if fits. Here: are some examples of the different masks that are available:       Key points to remember on your journey with sleep apnea:  Sleep study.  PAP devices often need to be adjusted during a sleep study to show that they are effective and adjusted right.  Good tips to remember: Try wearing just the mask during a quiet time during the day so your body adapts to wearing it. A humidifier is recommended for comfort in most cases to prevent drying of your nose and throat. Allergy medication from your provider may help you if you are having nasal congestion.  Getting settled-in. It takes more than one night for most of us to get used to wearing a mask. Try wearing just the mask during a quiet time during the day so your body adapts to wearing it. A humidifier is recommended for comfort in most cases. Our team will work with you carefully on the first day and  will be in contact within 4 days and again at 2 and 4 weeks for advice and remote device adjustments. Your therapy is evaluated by the device each day.   Use it every night. The more you are able to sleep naturally for 7-8 hours, the more likely you will have good sleep and to prevent health risks or symptoms from sleep apnea. Even if you use it 4 hours it helps. Occasionally all of us are unable to use a medical therapy, in sleep apnea, it is not dangerous to miss one night.   Communicate. Call our skilled team on the number provided on the first day if your visit for problems that make it difficult to wear the device. Over 2 out of 3 patients can learn to wear the device long-term with help from our team. Remember to call our team or your sleep providers if you are unable to wear the device as we may have other solutions for those who cannot adapt to mask CPAP therapy. It is recommended that you sleep your sleep provider within the first 3 months and yearly after that if you are not having problems.   Use it for your health. We encourage use of CPAP masks during daytime quiet periods to allow your face and brain to adapt to the sensation of CPAP so that it will be a more natural sensation to awaken to at night or during naps. This can be very useful during the first few weeks or months of adapting to CPAP though it does not help medically to wear CPAP during wakefulness and  should not be used as a strategy just to meet guidelines.  Take care of your equipment. Make sure you clean your mask and tubing using directions every day and that your filter and mask are replaced as recommended or if they are not working.     BESIDES CPAP, WHAT OTHER THERAPIES ARE THERE?    Positioning Device  Positioning devices are generally used when sleep apnea is mild and only occurs on your back.This example shows a pillow that straps around the waist. It may be appropriate for those whose sleep study shows milder sleep apnea that  occurs primarily when lying flat on one's back. Preliminary studies have shown benefit but effectiveness at home may need to be verified by a home sleep test. These devices are generally not covered by medical insurance.  Examples of devices that maintain sleeping on the back to prevent snoring and mild sleep apnea.    Belt type body positioner  http://Venuu.Nimbix/    Electronic reminder  http://nightshifttherapy.com/            Oral Appliance  What is oral appliance therapy?  An oral appliance device fits on your teeth at night like a retainer used after having braces. The device is made by a specialized dentist and requires several visits over 1-2 months before a manufactured device is made to fit your teeth and is adjusted to prevent your sleep apnea. Once an oral device is working properly, snoring should be improved. A home sleep test may be recommended at that time if to determine whether the sleep apnea is adequately treated.       Some things to remember:  -Oral devices are often, but not always, covered by your medical insurance. Be sure to check with your insurance provider.   -If you are referred for oral therapy, you will be given a list of specialized dentists to consider or you may choose to visit the Web site of the American Academy of Dental Sleep Medicine  -Oral devices are less likely to work if you have severe sleep apnea or are extremely overweight.     More detailed information  An oral appliance is a small acrylic device that fits over the upper and lower teeth  (similar to a retainer or a mouth guard). This device slightly moves jaw forward, which moves the base of the tongue forward, opens the airway, improves breathing for effective treat snoring and obstructive sleep apnea in perhaps 7 out of 10 people .  The best working devices are custom-made by a dental device  after a mold is made of the teeth 1, 2, 3.  When is an oral appliance indicated?  Oral appliance therapy is  recommended as a first-line treatment for patients with primary snoring, mild sleep apnea, and for patients with moderate sleep apnea who prefer appliance therapy to use of CPAP4, 5. Severity of sleep apnea is determined by sleep testing and is based on the number of respiratory events per hour of sleep.   How successful is oral appliance therapy?  The success rate of oral appliance therapy in patients with mild sleep apnea is 75-80% while in patients with moderate sleep apnea it is 50-70%. The chance of success in patients with severe sleep apnea is 40-50%. The research also shows that oral appliances have a beneficial effect on the cardiovascular health of MARCELLE patients at the same magnitude as CPAP therapy7.  Oral appliances should be a second-line treatment in cases of severe sleep apnea, but if not completely successful then a combination therapy utilizing CPAP plus oral appliance therapy may be effective. Oral appliances tend to be effective in a broad range of patients although studies show that the patients who have the highest success are females, younger patients, those with milder disease, and less severe obesity. 3, 6.   Finding a dentist that practices dental sleep medicine  Specific training is available through the American Academy of Dental Sleep Medicine for dentists interested in working in the field of sleep. To find a dentist who is educated in the field of sleep and the use of oral appliances, near you, visit the Web site of the American Academy of Dental Sleep Medicine.    References  1. Senait et al. Objectively measured vs self-reported compliance during oral appliance therapy for sleep-disordered breathing. Chest 2013; 144(5): 1212-5571.  2. Ke et al. Objective measurement of compliance during oral appliance therapy for sleep-disordered breathing. Thorax 2013; 68(1): 91-96.  3. Jovita et al. Mandibular advancement devices in 620 men and women with MARCELLE and snoring:  tolerability and predictors of treatment success. Chest 2004; 125: 1932-0846.  4. Erwin et al. Oral appliances for snoring and MARCELLE: a review. Sleep 2006; 29: 244-262.  5. Eve et al. Oral appliance treatment for MARCELLE: an update. J Clin Sleep Med 2014; 10(2): 215-227.  6. Anastasiia et al. Predictors of OSAH treatment outcome. J Dent Res 2007; 86: 4142-7155.      Weight Loss:    Weight loss is a long-term strategy that may improve sleep apnea in some patients.    Weight management is a personal decision and the decision should be based on your interest and the potential benefits.  If you are interested in exploring weight loss strategies, the following discussion covers the impact on weight loss on sleep apnea and the approaches that may be successful.    Being overweight does not necessarily mean you will have health consequences.  Those who have BMI over 35 or over 27 with existing medical conditions carries greater risk.   Weight loss decreases severity of sleep apnea in most people with obesity. For those with mild obesity who have developed snoring with weight gain, even 15-30 pound weight loss can improve and occasionally eliminate sleep apnea.  Structured and life-long dietary and health habits are necessary to lose weight and keep healthier weight levels.     Though there may be significant health benefits from weight loss, long-term weight loss is very difficult to achieve- studies show success with dietary management in less than 10% of people. In addition, substantial weight loss may require years of dietary control and may be difficult if patients have severe obesity. In these cases, surgical management may be considered.  Finally, older individuals who have tolerated obesity without health complications may be less likely to benefit from weight loss strategies.      [unfilled]    Surgery:    Surgery for obstructive sleep apnea is considered generally only when other therapies fail to work.  Surgery may be discussed with you if you are having a difficult time tolerating CPAP and or when there is an abnormal structure that requires surgical correction.  Nose and throat surgeries often enlarge the airway to prevent collapse.  Most of these surgeries create pain for 1-2 weeks and up to half of the most common surgeries are not effective throughout life.  You should carefully discuss the benefits and drawbacks to surgery with your sleep provider and surgeon to determine if it is the best solution for you.   More information  Surgery for MARCELLE is directed at areas that are responsible for narrowing or complete obstruction of the airway during sleep.  There are a wide range of procedures available to enlarge and/or stabilize the airway to prevent blockage of breathing in the three major areas where it can occur: the palate, tongue, and nasal regions.  Successful surgical treatment depends on the accurate identification of the factors responsible for obstructive sleep apnea in each person.  A personalized approach is required because there is no single treatment that works well for everyone.  Because of anatomic variation, consultation with an examination by a sleep surgeon is a critical first step in determining what surgical options are best for each patient.  In some cases, examination during sedation may be recommended in order to guide the selection of procedures.  Patients will be counseled about risks and benefits as well as the typical recovery course after surgery. Surgery is typically not a cure for a person s MARCELLE.  However, surgery will often significantly improve one s MARCELLE severity (termed  success rate ).  Even in the absence of a cure, surgery will decrease the cardiovascular risk associated with OSA7; improve overall quality of life8 (sleepiness, functionality, sleep quality, etc).      Palate Procedures:  Patients with MARCELLE often have narrowing of their airway in the region of their tonsils and  uvula.  The goals of palate procedures are to widen the airway in this region as well as to help the tissues resist collapse.  Modern palate procedure techniques focus on tissue conservation and soft tissue rearrangement, rather than tissue removal.  Often the uvula is preserved in this procedure. Residual sleep apnea is common in patient after pharyngoplasty with an average reduction in sleep apnea events of 33%2.      Tongue Procedures:  ExamWhile patients are awake, the muscles that surround the throat are active and keep this region open for breathing. These muscles relax during sleep, allowing the tongue and other structures to collapse and block breathing.  There are several different tongue procedures available.  Selection of a tongue base procedure depends on characteristics seen on physical exam.  Generally, procedures are aimed at removing bulky tissues in this area or preventing the back of the tongue from falling back during sleep.  Success rates for tongue surgery range from 50-62%3.    Hypoglossal Nerve Stimulation:  Hypoglossal nerve stimulation has recently received approval from the [place] Food and Drug Administration for the treatment of obstructive sleep apnea.  This is based on research showing that the system was safe and effective in treating sleep apnea6.  Results showed that the median AHI score decreased 68%, from 29.3 to 9.0. This therapy uses an implant system that senses breathing patterns and delivers mild stimulation to airway muscles, which keeps the airway open during sleep.  The system consists of three fully implanted components: a small generator (similar in size to a pacemaker), a breathing sensor, and a stimulation lead.  Using a small handheld remote, a patient turns the therapy on before bed and off upon awakening.    Candidates for this device must be greater than 18 years of age, have moderate to severe MARCELLE (AHI between 15-65), BMI less than 35, have tried CPAP/oral  appliance for at least 8 weeks without success, and have appropriate upper airway anatomy (determined by a sleep endoscopy performed by Dr. Jacobo Moore).    Hypoglossal Nerve Stimulation Pathway:    The sleep surgeon s office will work with the patient through the insurance prior-authorization process (including communications and appeals).    Nasal Procedures:  Nasal obstruction can interfere with nasal breathing during the day and night.  Studies have shown that relief of nasal obstruction can improve the ability of some patients to tolerate positive airway pressure therapy for obstructive sleep apnea1.  Treatment options include medications such as nasal saline, topical corticosteroid and antihistamine sprays, and oral medications such as antihistamines or decongestants. Non-surgical treatments can include external nasal dilators for selected patients. If these are not successful by themselves, surgery can improve the nasal airway either alone or in combination with these other options.      Combination Procedures:  Combination of surgical procedures and other treatments may be recommended, particularly if patients have more than one area of narrowing or persistent positional disease.  The success rate of combination surgery ranges from 66-80%2,3.    References  Bartolo ANTONIO. The Role of the Nose in Snoring and Obstructive Sleep Apnoea: An Update.  Eur Arch Otorhinolaryngol. 2011; 268: 1365-73.   Capjose SM; Nii JA; Daniela JR; Pallanch JF; Melvi MB; Tena SG; Shad ROBERTS. Surgical modifications of the upper airway for obstructive sleep apnea in adults: a systematic review and meta-analysis. SLEEP 2010;33(10):5946-9129. Aidee PAZ. Hypopharyngeal surgery in obstructive sleep apnea: an evidence-based medicine review.  Arch Otolaryngol Head Neck Surg. 2006 Feb;132(2):206-13.  Joseph YH1, Satnam Y, Nabor CONLEY. The efficacy of anatomically based multilevel surgery for obstructive sleep apnea. Otolaryngol Head Neck Surg.  2003 Oct;129(4):327-35.  Kezirian E, Goldberg A. Hypopharyngeal Surgery in Obstructive Sleep Apnea: An Evidence-Based Medicine Review. Arch Otolaryngol Head Neck Surg. 2006 Feb;132(2):206-13.  Reed APONTE et al. Upper-Airway Stimulation for Obstructive Sleep Apnea.  N Engl J Med. 2014 Jan 9;370(2):139-49.  Caro Y et al. Increased Incidence of Cardiovascular Disease in Middle-aged Men with Obstructive Sleep Apnea. Am J Respir Crit Care Med; 2002 166: 159-165  Prather EM et al. Studying Life Effects and Effectiveness of Palatopharyngoplasty (SLEEP) study: Subjective Outcomes of Isolated Uvulopalatopharyngoplasty. Otolaryngol Head Neck Surg. 2011; 144: 623-631.        WHAT IF I ONLY HAVE SNORING?    Mandibular advancement devices, lateral sleep positioning, long-term weight loss and treatment of nasal allergies have been shown to improve snoring.  Exercising tongue muscles with a game (https://BenchBanking.AxialMED/Amerpages/lona/soundly-reduce-snoring/[email or web address]) or stimulating the tongue during the day with a device (https://doi.org/10.3390/usv11691880) have improved snoring in some individuals.    Remember to Drive Safe... Drive Alive     Sleep health profoundly affects your health, mood, and your safety.  Thirty three percent of the population (one in three of us) is not getting enough sleep and many have a sleep disorder. Not getting enough sleep or having an untreated / undertreated sleep condition may make us sleepy without even knowing it. In fact, our driving could be dramatically impaired due to our sleep health. As your provider, here are some things I would like you to know about driving:     Here are some warning signs for impairment and dangerous drowsy driving:              -Having been awake more than 16 hours               -Looking tired               -Eyelid drooping              -Head nodding (it could be too late at this point)              -Driving for more than 30 minutes     Some things you could do to make  the driving safer if you are experiencing some drowsiness:              -Stop driving and rest              -Call for transportation              -Make sure your sleep disorder is adequately treated     Some things that have been shown NOT to work when experiencing drowsiness while driving:              -Turning on the radio              -Opening windows              -Eating any  distracting  /  entertaining  foods (e.g., sunflower seeds, candy, or any other)              -Talking on the phone      Your decision may not only impact your life, but also the life of others. Please, remember to drive safe for yourself and all of us.

## 2023-05-08 NOTE — PROGRESS NOTES
Virtual Visit Details    Type of service:  Video Visit   Video visit start time 1:06 PM  Video visit end time 1:40 PM  Originating Location (pt. Location): Home    Distant Location (provider location):  Off-site  Platform used for Video Visit: [facility]     Outpatient Sleep Medicine Consultation:      Name: Oneil Fuller MRN# [id number]   Age: 63 year old YOB: 1960     Date of Consultation: May 8, 2023  Consultation is requested by: Umberto Georges MD  [address] [place] [address] Umberto Georges  Primary care provider: Oneil Stacy       Reason for Sleep Consult:     Oneil Fuller is sent by Umberto Georges for a sleep consultation regarding morning headache and MARCELLE concern.    Patient s Reason for visit  Oneil Fuller main reason for visit: Cronic headache problem.  Can't proceed until rule out sleep as issue  Patient states problem(s) started: Dec 2020 work accident, head injury. Concussion.  Also bad shoulders and neck issues  Oneil Fuller's goals for this visit: See if sleep disorder is related headaches.  Back, broken nose, neck, cancer, insomnia         Assessment and Plan:     Summary Sleep Diagnoses:  1. Snoring  2. Daytime sleepiness  3. Morning headache  Comorbid Diagnoses:  4. Class 1 obesity due to excess calories with serious comorbidity and body mass index (BMI) of 34.0 to 34.9 in adult  5. Essential hypertension  6. Anxiety    Patient is being evaluated for Obstructive Sleep Apnea (MARCELLE).  Patient's risk factors for MARCELLE include: snoring (though not habitual), daytime sleepiness (ESS 10), morning headaches, enlarged neck girth (per pt 17.5in, no measurement today due to virtual visit), obesity (BMI 34.6), high blood pressure, age >50, and male gender. Comorbid mood disorder. We discussed pathophysiology of MARCELLE and consequences of untreated disease. Patient is interested in treatment and willing to undergo overnight sleep testing.  "Discussed testing with overnight attended polysomnography versus home sleep apnea testing. HST preferred by insurance, no comorbidity requiring in lab assessment so HST ordered today. Briefly discussed potential treatment modalities (CPAP vs oral appliance) in the event sleep apnea is identified on testing and additional information given in patient instructions. Will plan to discuss in more detail at next visit pending study results but appeared open to either today. Will plan to see him back about 2 weeks after HST for results discussion. Educational materials provided in instructions.   - HST-Home Sleep Apnea Test - Noxturnal Returnable; Future         History of Present Illness:   Oneil Fuller is a 63-year-old male with obesity, type 2 diabetes mellitus, hypertension, anxiety, GERD, multiple prior concussions and chronic headaches who presents to clinic today after referral from neurology for evaluation of possible sleep apnea in the setting of chronic daily headache, worse in the morning and concern sleep apnea may be exacerbating. Headaches started after most recent concussion in December 2020 at work. Can sometimes wake with headache immediately or other days develops shortly after waking. States \"it feels like I got hit in the head again like the accident at work a stinging headache\", in AM they last 30-60 minutes until he takes his morning Topamax dose.     SLEEP-WAKE SCHEDULE:     Work/School Days: Patient goes to school/work: Not currently, leave of absence due to headaches  Usually gets into bed at 9:30pm  Takes patient about 20 minutes to fall asleep  Has trouble falling asleep 1 or 2 nights per week  Wakes up in the middle of the night 1 time.  Wakes up due to Pain;External stimuli (bed partner, pets, noise, etc) - tingling feet, bilat shoulder pain   He has trouble falling back asleep 1 times a week.   It usually takes Five to ten if back or shoulders not hurting to get back to sleep  Patient is " "usually up at 7:30-8:00am (It was 4:30am before my leave of absence)  Uses alarm: Yes    Weekends/Non-work Days/All Other Days:  Same as above     Sleep Need  Patient estimates he gets 7 hrs sleep on average   Patient thinks he needs about 7 hrs sleep    Oneil Fuller prefers to sleep in this position(s): Back;Side;Head Elevated   Patient states they do the following activities in bed: Read;Use phone, computer, or tablet    Naps  Patient takes a purposeful nap 0 times a week   He feels better after a nap: Yes  He dozes off unintentionally 1 days per week - Friday afternoons doze off while watching YouTube for 30 minutes  Patient has had a driving accident or near-miss due to sleepiness/drowsiness: No accident but states \"I think I almost zoned out one time and that was scary\".   He had a total [place] Sleepiness Scale of 10 (05/07/23 1910), with scores of 10 or higher indicating abnormal levels of sleepiness.    SLEEP DISRUPTIONS:    Breathing/Snoring  Patient snores:Yes \"when I am really tired\"  Other people complain about his snoring: Yes  Patient has been told he stops breathing in his sleep:No   No gasp/choking arousals   He has issues with the following: Morning headaches;Heartburn or reflux at night    Movement:  Patient gets pain, discomfort, with an urge to move:  No  It happens when he is resting:  No  It happens more at night:  No  Patient has been told he kicks his legs at night:  No     Behaviors in Sleep:  Oneil Fuller has experienced the following behaviors while sleeping: Teeth grinding couple years ago, not current problem  Denies sleepwalking, sleep talking, sleep eating, dream enactment, recurring nightmares, night terrors, sleep paralysis, hypnagogic/hypnopompic hallucinations, cataplexy    CAFFEINE AND OTHER SUBSTANCES:    Patient consumes caffeinated beverages per day:  1  Last caffeine use is usually: Noon  List of any prescribed or over the counter stimulants that patient takes:  " None  List of any prescribed or over the counter sleep medication patient takes:  None  List of previous sleep medications that patient has tried: PM meds, cold medicines  Patient drinks alcohol to help them sleep: No  Patient drinks alcohol near bedtime: No    Family History:  Patient has a family member been diagnosed with a sleep disorder: Yes  Father had a clinical depression.  Had a pills to get up and pills to go to sleep.     SCALES:    EPWORTH SLEEPINESS SCALE         5/7/2023     7:10 PM    [place] Sleepiness Scale ( JJ Mckeon  [id number]<br>ESS - [place]/English - Final version - 21 Nov 07 - [facility].)   Sitting and reading Moderate chance of dozing   Watching TV Moderate chance of dozing   Sitting, inactive in a public place (e.g. a theatre or a meeting) Would never doze   As a passenger in a car for an hour without a break High chance of dozing   Lying down to rest in the afternoon when circumstances permit High chance of dozing   Sitting and talking to someone Would never doze   Sitting quietly after a lunch without alcohol Would never doze   In a car, while stopped for a few minutes in traffic Would never doze   L[place] Score (MC) 10   Lusby Score (Sleep) 10         INSOMNIA SEVERITY INDEX (YOUSUF)          5/7/2023     6:36 PM   Insomnia Severity Index (YOUSUF)   Difficulty falling asleep 3   Difficulty staying asleep 0   Problems waking up too early 0   How SATISFIED/DISSATISFIED are you with your CURRENT sleep pattern? 3   How NOTICEABLE to others do you think your sleep problem is in terms of impairing the quality of your life? 3   How WORRIED/DISTRESSED are you about your current sleep problem? 3   To what extent do you consider your sleep problem to INTERFERE with your daily functioning (e.g. daytime fatigue, mood, ability to function at work/daily chores, concentration, memory, mood, etc.) CURRENTLY? 3   YOUSUF Total Score 15       Guidelines for Scoring/Interpretation:  Total score  categories:  0-7 = No clinically significant insomnia   8-14 = Subthreshold insomnia   15-21 = Clinical insomnia (moderate severity)  22-28 = Clinical insomnia (severe)  Used via courtesy of www.myhealth.va.gov with permission from Vincenzo Jacome PhD., [facility]    Allergies:    No Known Allergies    Medications:    Current Outpatient Medications   Medication Sig Dispense Refill     aspirin-sod bicarb-citric acid (TRISHA-SELTZER) 324 mg TbEF [ASPIRIN-SOD BICARB-CITRIC ACID (TRISHA-SELTZER) 324 MG TBEF] Take 325 mg by mouth every 6 (six) hours as needed (takes once a day).       fluticasone (FLONASE) 50 MCG/ACT nasal spray Spray 1 spray into both nostrils as needed for rhinitis or allergies       losartan (COZAAR) 50 MG tablet Take 1 tablet (50 mg) by mouth daily 90 tablet 3     naproxen sodium (ANAPROX) 220 MG tablet Take 220 mg by mouth as needed for moderate pain (4-6)       omeprazole (PRILOSEC) 20 MG capsule [OMEPRAZOLE (PRILOSEC) 20 MG CAPSULE] Take 20 mg by mouth daily.       PARoxetine (PAXIL) 20 MG tablet [PAROXETINE (PAXIL) 20 MG TABLET] TAKE 3 TABLETS(60 MG) BY MOUTH DAILY 270 tablet 3     topiramate (TOPAMAX) 25 MG tablet Take 2 tablets (50 mg) by mouth 2 times daily 360 tablet 3       Problem List:  Patient Active Problem List    Diagnosis Date Noted     Type 2 diabetes mellitus without complication, without long-term current use of insulin (H) 04/04/2023     Priority: Medium     Encounter for annual general medical examination without abnormal findings in adult 11/23/2018     Priority: Medium     Mixed hyperlipidemia      Priority: Medium     Created by Conversion         Anxiety      Priority: Medium     Created by Conversion  Replacement Utility updated for latest IMO load         Essential Hypertension      Priority: Medium     Created by Conversion  Replacement Utility updated for latest IMO load         Internal Hemorrhoids      Priority: Medium     Created by Conversion  Replacement Utility  updated for latest IMO load         Diverticulosis      Priority: Medium     Created by Conversion  Replacement Utility updated for latest IMO load         Capillary hemangioma 04/14/2015     Priority: Medium     Impaired Fasting Glucose      Priority: Medium     Created by Conversion         Pain In The Thigh      Priority: Medium     Created by Conversion         Embryonal carcinoma (H)      Priority: Medium     Created by Conversion  [facility]notation: Jan 30 2014 10:25AM Oneil Westfall: testicle         Obesity      Priority: Medium     Created by Conversion         Hematuria      Priority: Medium     Created by Conversion         Esophageal Reflux      Priority: Medium     Created by Conversion         Snoring (Symptom)      Priority: Medium     Created by Conversion            Past Medical/Surgical History:  Past Medical History:   Diagnosis Date     Anxiety      Cancer (H)      GERD (gastroesophageal reflux disease)      Hypertension      Past Surgical History:   Procedure Laterality Date     OTHER SURGICAL HISTORY      orchectomy     TONSILLECTOMY         Social History:  Social History     Socioeconomic History     Marital status:      Spouse name: Not on file     Number of children: Not on file     Years of education: Not on file     Highest education level: Not on file   Occupational History     Not on file   Tobacco Use     Smoking status: Never     Smokeless tobacco: Never   Vaping Use     Vaping status: Not on file   Substance and Sexual Activity     Alcohol use: Yes     Comment: rare     Drug use: No     Sexual activity: Not on file   Other Topics Concern     Not on file   Social History Narrative     Not on file     Social Determinants of Health     Financial Resource Strain: Not on file   Food Insecurity: Not on file   Transportation Needs: Not on file   Physical Activity: Not on file   Stress: Not on file   Social Connections: Not on file   Intimate Partner Violence: Not on file    Housing Stability: Not on file       Family History:  Family History   Problem Relation Age of Onset     Diabetes Mother      Skin Cancer Father      Arthritis Father      Arthritis Brother        Review of Systems:  In the last TWO WEEKS have you experienced any of the following symptoms?  Fevers: No  Night Sweats: No  Weight Gain: No  Pain at Night: Yes  Double Vision: No  Changes in Vision: No  Difficulty Breathing through Nose: Yes  Sore Throat in Morning: Yes  Dry Mouth in the Morning: Yes  Shortness of Breath Lying Flat: Yes  Shortness of Breath With Activity: Yes  Awakening with Shortness of Breath: Yes  Increased Cough: Yes  Heart Racing at Night: No  Swelling in Feet or Legs: No  Diarrhea at Night: Yes  Heartburn at Night: Yes  Urinating More than Once at Night: No  Losing Control of Urine at Night: No  Joint Pains at Night: No  Headaches in Morning: Yes  Weakness in Arms or Legs: No  Depressed Mood: Yes  Anxiety: Yes     Physical Examination:  Vitals: Ht 1.829 m (6')   Wt 115.7 kg (255 lb)   BMI 34.58 kg/m    BMI= Body mass index is 34.58 kg/m .  General appearance: Awake, alert, cooperative. Well groomed. Sitting comfortably in chair. In no apparent distress.  HEENT: Head: Normocephalic, atraumatic. Eyes:Conjunctiva clear. Sclera normal. Nose: External appearance without deformity.   Pulmonary:  Able to speak easily in full sentences. No cough or wheeze.   Skin:  No rashes or significant lesions on visible skin.   Neurologic: Alert, oriented x3.   Psychiatric: Mood euthymic. Affect congruent with full range and intensity.         Data: All pertinent previous laboratory data reviewed     Recent Labs   Lab Test 01/03/23  1541 07/08/22  0811    141   POTASSIUM 4.2 5.2   CHLORIDE 107 105   CO2 24 26   ANIONGAP 10 10   * 126*   BUN 22.1 20.8   CR 1.25* 1.10   WAGNER 10.6* 10.5*       No results for input(s): WBC, RBC, HGB, HCT, MCV, MCH, MCHC, RDW, PLT in the last 81730 hours.    Recent Labs    Lab Test 11/23/18  1011   PROTTOTAL 7.1   ALBUMIN 4.0   BILITOTAL 1.0   ALKPHOS 52   AST 24   ALT 27       No results found for: TSH    No results found for: UAMP, UBARB, BENZODIAZEUR, UCANN, UCOC, OPIT, UPCP    No results found for: IRONSAT, [id number], TD    No results found for: PH, PHARTERIAL, PO2, [id number], SAT, PCO2, HCO3, BASEEXCESS, CRYSTAL, BEB    @LABRCNTIPR(phv:4,pco2v:4,po2v:4,hco3v:4,karel:4,o2per:4)@    Echocardiology: No results found for this or any previous visit (from the past 4320 hour(s)).    Chest x-ray: XR Chest 2 Views 05/31/2022    Narrative  EXAM: XR CHEST 2 VW  LOCATION: [facility]  DATE/TIME: 5/31/2022 8:08 AM    INDICATION:  Cough.  COMPARISON: None.    Impression  IMPRESSION: Negative chest. Lungs are clear. Normal heart size. Surgical clips upper abdomen.      Chest CT: No results found for this or any previous visit from the past 365 days.      PFT: Most Recent [facility] Pulmonary Function Testing    No results found for: 20001  No results found for: 20002  No results found for: 20003  No results found for: 20015  No results found for: 20016  No results found for: 20027  No results found for: 20028  No results found for: 20029  No results found for: 20079  No results found for: 20080  No results found for: 20081  No results found for: 20335  No results found for: 20105  No results found for: 20053  No results found for: 20054  No results found for: 20055      Lise Amezcua PA-C 5/8/2023     [facility]  [address], [place] [address]     [facility] [place]  [address], [place] [address]    Chart documentation was completed, in part, with [employer] voice-recognition software. Even though reviewed, some grammatical, spelling, and word errors may remain.    55 minutes spent on day of encounter reviewing medical records, history and physical examination, review of previous testing and  interpretation, documentation and further activities as noted above

## 2023-05-08 NOTE — NURSING NOTE
Is the patient currently in the state of [place]? YES    Visit mode:VIDEO    If the visit is dropped, the patient can be reconnected by: VIDEO VISIT: Text to cell phone: [phone]    Will anyone else be joining the visit? NO      How would you like to obtain your AVS? MyChart    Are changes needed to the allergy or medication list? NO    Reason for visit: Video Visit        Nikita Peña

## 2023-06-11 NOTE — PROGRESS NOTES
"In person evaluation      HPI  1/3/2023, in person consultation  2/7/2023, in person visit  6/13/2023, in person visit      62-year-old being evaluated neurologically for: Left  Concussion x4  Persistent daily headache  Asymmetric pupils right greater than left  History of testicular cancer age 17 status post chemotherapy    Patient followed for persistent chronic daily headache    Since last seen February after increasing the Topamax a bit feels that things are a little better  Does better when he sleeps better    Original sleep evaluation 5/8/2023 but has not had a sleep study yet    Also discussed that he may have poor memory  He has talked a lot of people like a chemotherapy  He had chemotherapy back at age 17    He reiterated that he has had probably at least 4 concussions    He did say that his  might be contacting us to discuss his \"work-related component\"  Patient was seen late in the process I did not see him early on right after the concussion.          Patient with fairly persistent chronic daily headache  Worse in the morning than in the evening  Occurred are exacerbated after concussion December 2020 after getting hit in the head at work.  No loss of conscious at the event         Previous concussions prior to this        Previously had a lot of neck pain from concussions that triggered headaches.         And they were better after chiropractic treatment for years in the past              Patient gets FMLA forms filled out by primary and she continue follow-up with primary for any work restrictions        Has had work restrictions and FMLA over the last 2 years             Discussed concerned that with the headache being worse in the morning.          Question whether there is a component of sleep apnea that could be exacerbating the headache and making it difficult to control.          Had some response to Topamax low-dose          EEG had normal background rhythms but some rare theta           " which can be seen with patients that are overtired sleep deprived or with previous concussion          Supraclinoid ICA infundibulum 2 mm could reflect the left posterior communicating artery origin             MRI of the head showed some small vessel ischemic changes nonspecific         MRA showed a left Supraclinoid ICA infundibulum 2 mm could reflect the left posterior communicating artery origin      Current plan  1.  Increase Topamax, 25 mg tablet, 3 tabs p.o. twice daily (increased 6/13/2023)    2.  Highly recommend formal sleep study to make sure there is not con founding medical condition that prevents improvement.       Should be getting this study performed this week and June 16, 2023 will be treated by the sleep specialist    3.  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    4.  Work restrictions/FMLA forms per patient's primary MD who has been filling these out in the past       Patient had a lot of questions about personal injury 's and the representation.       He should do what he needs to do to have proper representation I am currently seeing the patient greater than 2 years from the event.       And with the patient with multiple concussions in the past difficulty know if all the symptoms would be from this 1 event.    5.  Mentioned that he is wondering if he has memory difficulties or chemo brain from his distant chemotherapy at age 17       This would be hard to tell after some many decades.        Does take Paxil for his mood disorder          A.  Persistent daily a.m. headache       Onset  2021 or so       Can be on either side       Awakens with a headache sometimes gets better after he is up and moves around       May be there later  "in the day         Vague about associated symptoms       Has some left lower quadrant visual changes at times       No nausea or vomiting       No specific photophobia or phonophobia         No past history of \"migraine\"       No history of childhood motion sickness         Family history positive for migraines in his mother and possibly his sister when she was a teenager            Started on topiramate 25 mg twice daily by primary June 2022        Did seem to help          No history of kidney stones stays well-hydrated no excessive paresthesias or glaucoma or abnormal taste        Tolerating low-dose medication well                                    Original          6/13/2023        Frequency      CDH              3/week          Duration          ------              1 to 2 hours        B.  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  Subtle rare brief 5 Hz theta disorganization left frontal temporal region          2.  Otherwise normal EEG with normal drowsiness and sleep stage I.      C.  Asymmetric pupils found on exam        Right greater than left        No ptosis no ophthalmoplegia        Both have some reactivity both consensual and direct        Unclear if this is a partial Aides  pupil from trauma with past wrestling              MRI scan brain 1/23/2023          1.  No acute intracranial process.          2.  Generalized brain atrophy and presumed microvascular ischemic.        "   MRA intracranial vessels 1/23/2023          1.  No high-grade stenosis or large vessel occlusion.          2.  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      D.  Question snoring not significant per patient's wife by his report        Does have a.m. headache though concern for sleep apnea           EEG 1/10/2023 (8-9 Hz percent rhythm)         Mildly abnormal EEG due to:         1.  Subtle rare brief 5 Hz theta disorganization left frontal temporal region          2.  Otherwise normal EEG with normal drowsiness and sleep stage I.           Past medical history  Hyperlipidemia  Hypertension  Testicular cancer, chemo,  (orchiectomy age 17)  GERD/diverticulosis  Anxiety  Snoring        Habits  Does not smoke  Does not drink alcohol      Family history  Father arthritis  Mother diabetes/migraine  Brother arthritis  Sister migraines in her teenage years      Work-up  B12 435, TSH 0.98, (7/25/2016)  EEG 1/10/2023 (8-9 Hz percent rhythm)  Mildly abnormal EEG due to:  1.  Subtle rare brief 5 Hz theta disorganization left frontal temporal region  2.  Otherwise normal EEG with normal drowsiness and sleep stage I.  MRI scan brain 1/23/2023  1.  No acute intracranial process.  2.  Generalized brain atrophy and presumed microvascular ischemic.  MRA intracranial vessels 1/23/2023  1.  No high-grade stenosis or large vessel occlusion.  2.  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        Laboratory data review                          11/2018           7/2022              1/3/2023    NA/K                139/4.7            141/5.2              141/4.2    BUN/Cr            20/0.89            20.8/1.10           22.1/1.25    GLU                  117                  126                   117    AST                   24                    -----    Ca                                               10.5                  10.6    HDL/LDL          39/157              38/139    HGBA1C          6.2                        6.3                                                    (11/2022)            Exam    Review of systems see above  Pertinent positives  Some neck and back pain  Some numbness and tingling more so in his feet chronic    Headaches that are more in the morning  Does have some history of chest pain and some shortness of breath but not currently    Some snoring    Denies diplopia dysarthria dysphagia  Some left lower quadrant visual change in the left eye  No focal weakness  Gait okay    Otherwise review systems negative see intake sheet    General exam  Blood pressure 146/94, pulse 77  Alert oriented x3  HEENT significant for right greater than left pupil  No ptosis  Lungs clear  Heart rate regular  Abdomen soft  Symmetrical pulses  No edema the feet    Neurologic exam  Alert orient x3  Normal prosody of speech  Normal naming  Normal comprehension  Normal repetition  No aphasia  No neglect  Memory and recall okay (somewhat of a roundabout historian)    Cranial nerves II through XII significant for  Right greater than left pupil both reactive  No ptosis  No ophthalmoplegia  No nystagmus  Bedside visual fields intact  Face symmetrical  Tongue twisters good    Upper extremities  No drift  No tremor  Normal rapid alternating movements    Lower extremities  Distal proximal strength good    Reflexes  Triceps 2+/2+  Biceps 2+/2+  Santiago reflex negative  Knees 2+/2+  Ankles absent  Toes downgoing    Sensory exam  Decreased vibratory sense in the feet compared to the hands  Temperature appreciation and pinprick normal in the upper and lower extremities  Light touch intact    Gait  Normal  Romberg negative    Neuro exam stable    Assessment/plan    1.  Persistent a.m. headache onset 2021 or so       Some history of snoring question sleep apnea type headache    2.   History of multiple concussions at least 4 by recall different events throughout his life       Possible postconcussive type headache secondary to multiple concussions       Some family history of migraine       Started on topiramate June 2022 low-dose 25 mg twice daily did help some       Topamax 50 mg twice daily (February 2023)       Topamax 25 mg twice daily increased (June 13, 2023)    3.  Asymmetric pupils right greater than left       Unsure if this is secondary to previous trauma or if could be related to aneurysm          MRA intracranial vessels 1/23/2023          1.  No high-grade stenosis or large vessel occlusion.          2.  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    4.   Mild small vessel ischemic changes on MRI scan        MRI scan brain 1/23/2023        1.  No acute intracranial process.        2.  Generalized brain atrophy and presumed microvascular ischemic.        History of hypertension/hyperlipidemia        Risk factor reduction per primary MD on blood pressure medication        Diagnosis  Persistent daily headache  Postconcussive disorder  Snoring question sleep apnea  Left supraclinoid infundibula  Multiple concussive/multi concussive syndrome        Current plan  1.   Started on topiramate June 2022 low-dose 25 mg twice daily did help some       Topamax 50 mg twice daily (February 2023)       Topamax 25 mg twice daily increased (June 13, 2023)    2.  Highly recommend formal sleep study to make sure there is not con founding medical condition that prevents improvement.       Formal home sleep study to be performed 6/13/2023 should follow sleep recommendations      3.  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    4.  Work restrictions/FMLA forms per patient's primary MD who has been filling these out in the past       Patient had a lot of questions about personal injury 's and their representation.       He should do what he needs to do to have proper representation I am currently seeing the patient greater than 2 years from the event.       And with the patient with multiple concussions in the past difficulty to know if all the symptoms would be from this 1 event.      Seems to be getting some benefit from the Topamax  We increase the dose slightly today on 6/13/2023 to 75 mg twice daily  We discussed side effects  We will stay well-hydrated  We will complete his sleep work-up    Follow-up in February 2024  Seems to be having some response to medication    Says that his  may call in regards to his work-related injury  Was seen after the fact has multiple concussions    32 minutes total care time today

## 2023-06-13 ENCOUNTER — OFFICE VISIT (OUTPATIENT)
Dept: NEUROLOGY | Facility: CLINIC | Age: 63
End: 2023-06-13
Payer: OTHER MISCELLANEOUS

## 2023-06-13 VITALS — SYSTOLIC BLOOD PRESSURE: 146 MMHG | DIASTOLIC BLOOD PRESSURE: 94 MMHG | HEART RATE: 77 BPM

## 2023-06-13 DIAGNOSIS — G89.29 CHRONIC NONINTRACTABLE HEADACHE, UNSPECIFIED HEADACHE TYPE: ICD-10-CM

## 2023-06-13 DIAGNOSIS — H57.02 PUPIL ASYMMETRY: ICD-10-CM

## 2023-06-13 DIAGNOSIS — I67.1 NONRUPTURED CEREBRAL ANEURYSM: ICD-10-CM

## 2023-06-13 DIAGNOSIS — F07.81 POSTCONCUSSIVE SYNDROME: Primary | ICD-10-CM

## 2023-06-13 DIAGNOSIS — R51.9 CHRONIC NONINTRACTABLE HEADACHE, UNSPECIFIED HEADACHE TYPE: ICD-10-CM

## 2023-06-13 PROCEDURE — 99214 OFFICE O/P EST MOD 30 MIN: CPT | Performed by: PSYCHIATRY & NEUROLOGY

## 2023-06-13 RX ORDER — TOPIRAMATE 25 MG/1
75 TABLET, FILM COATED ORAL 2 TIMES DAILY
Qty: 540 TABLET | Refills: 3 | Status: SHIPPED | OUTPATIENT
Start: 2023-06-13

## 2023-06-13 NOTE — LETTER
"    6/13/2023         RE: Oneil Fuller  [address] [facility] [address]        Dear Colleague,    Thank you for referring your patient, Oneil Fuller, to the [facility] [place]. Please see a copy of my visit note below.    In person evaluation      HPI  1/3/2023, in person consultation  2/7/2023, in person visit  6/13/2023, in person visit      62-year-old being evaluated neurologically for: Left  Concussion x4  Persistent daily headache  Asymmetric pupils right greater than left  History of testicular cancer age 17 status post chemotherapy    Patient followed for persistent chronic daily headache    Since last seen February after increasing the Topamax a bit feels that things are a little better  Does better when he sleeps better    Original sleep evaluation 5/8/2023 but has not had a sleep study yet    Also discussed that he may have poor memory  He has talked a lot of people like a chemotherapy  He had chemotherapy back at age 17    He reiterated that he has had probably at least 4 concussions    He did say that his  might be contacting us to discuss his \"work-related component\"  Patient was seen late in the process I did not see him early on right after the concussion.          Patient with fairly persistent chronic daily headache  Worse in the morning than in the evening  Occurred are exacerbated after concussion December 2020 after getting hit in the head at work.  No loss of conscious at the event         Previous concussions prior to this        Previously had a lot of neck pain from concussions that triggered headaches.         And they were better after chiropractic treatment for years in the past              Patient gets FMLA forms filled out by primary and she continue follow-up with primary for any work restrictions        Has had work restrictions and FMLA over the last 2 years             Discussed concerned that with the headache being worse in the " morning.          Question whether there is a component of sleep apnea that could be exacerbating the headache and making it difficult to control.          Had some response to Topamax low-dose          EEG had normal background rhythms but some rare theta           which can be seen with patients that are overtired sleep deprived or with previous concussion          Supraclinoid ICA infundibulum 2 mm could reflect the left posterior communicating artery origin             MRI of the head showed some small vessel ischemic changes nonspecific         MRA showed a left Supraclinoid ICA infundibulum 2 mm could reflect the left posterior communicating artery origin      Current plan  1.  Increase Topamax, 25 mg tablet, 3 tabs p.o. twice daily (increased 6/13/2023)    2.  Highly recommend formal sleep study to make sure there is not con founding medical condition that prevents improvement.       Should be getting this study performed this week and June 16, 2023 will be treated by the sleep specialist    3.  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    4.  Work restrictions/FMLA forms per patient's primary MD who has been filling these out in the past       Patient had a lot of questions about personal injury 's and the representation.       He should do what he needs to do to have proper representation I am currently seeing the patient greater than 2 years from the event.       And with the patient with multiple concussions in the past difficulty know if all the symptoms would be from this 1 event.    5.  Mentioned that he is wondering if he has memory difficulties or chemo brain from his distant chemotherapy at age 17       This would be hard to tell after  "some many decades.        Does take Paxil for his mood disorder          A.  Persistent daily a.m. headache       Onset  2021 or so       Can be on either side       Awakens with a headache sometimes gets better after he is up and moves around       May be there later in the day         Vague about associated symptoms       Has some left lower quadrant visual changes at times       No nausea or vomiting       No specific photophobia or phonophobia         No past history of \"migraine\"       No history of childhood motion sickness         Family history positive for migraines in his mother and possibly his sister when she was a teenager            Started on topiramate 25 mg twice daily by primary June 2022        Did seem to help          No history of kidney stones stays well-hydrated no excessive paresthesias or glaucoma or abnormal taste        Tolerating low-dose medication well                                    Original          6/13/2023        Frequency      CDH              3/week          Duration          ------              1 to 2 hours        B.  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  Subtle rare brief 5 Hz theta disorganization left frontal temporal region          2.  Otherwise normal EEG with normal drowsiness and sleep stage I.      C.  Asymmetric pupils found on exam        Right greater than left        No ptosis no ophthalmoplegia        Both have some reactivity " both consensual and direct        Unclear if this is a partial Aides  pupil from trauma with past wrestling              MRI scan brain 1/23/2023          1.  No acute intracranial process.          2.  Generalized brain atrophy and presumed microvascular ischemic.          MRA intracranial vessels 1/23/2023          1.  No high-grade stenosis or large vessel occlusion.          2.  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      D.  Question snoring not significant per patient's wife by his report        Does have a.m. headache though concern for sleep apnea           EEG 1/10/2023 (8-9 Hz percent rhythm)         Mildly abnormal EEG due to:         1.  Subtle rare brief 5 Hz theta disorganization left frontal temporal region          2.  Otherwise normal EEG with normal drowsiness and sleep stage I.           Past medical history  Hyperlipidemia  Hypertension  Testicular cancer, chemo,  (orchiectomy age 17)  GERD/diverticulosis  Anxiety  Snoring        Habits  Does not smoke  Does not drink alcohol      Family history  Father arthritis  Mother diabetes/migraine  Brother arthritis  Sister migraines in her teenage years      Work-up  B12 435, TSH 0.98, (7/25/2016)  EEG 1/10/2023 (8-9 Hz percent rhythm)  Mildly abnormal EEG due to:  1.  Subtle rare brief 5 Hz theta disorganization left frontal temporal region  2.  Otherwise normal EEG with normal drowsiness and sleep stage I.  MRI scan brain 1/23/2023  1.  No acute intracranial process.  2.  Generalized brain atrophy and presumed microvascular ischemic.  MRA intracranial vessels 1/23/2023  1.  No high-grade stenosis or large vessel occlusion.  2.  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        Laboratory data review                          11/2018 7/2022               1/3/2023    NA/K                139/4.7            141/5.2              141/4.2    BUN/Cr            20/0.89            20.8/1.10           22.1/1.25    GLU                  117                  126                   117    AST                   24                    -----    Ca                                              10.5                  10.6    HDL/LDL          39/157              38/139    HGBA1C          6.2                        6.3                                                    (11/2022)            Exam    Review of systems see above  Pertinent positives  Some neck and back pain  Some numbness and tingling more so in his feet chronic    Headaches that are more in the morning  Does have some history of chest pain and some shortness of breath but not currently    Some snoring    Denies diplopia dysarthria dysphagia  Some left lower quadrant visual change in the left eye  No focal weakness  Gait okay    Otherwise review systems negative see intake sheet    General exam  Blood pressure 146/94, pulse 77  Alert oriented x3  HEENT significant for right greater than left pupil  No ptosis  Lungs clear  Heart rate regular  Abdomen soft  Symmetrical pulses  No edema the feet    Neurologic exam  Alert orient x3  Normal prosody of speech  Normal naming  Normal comprehension  Normal repetition  No aphasia  No neglect  Memory and recall okay (somewhat of a roundabout historian)    Cranial nerves II through XII significant for  Right greater than left pupil both reactive  No ptosis  No ophthalmoplegia  No nystagmus  Bedside visual fields intact  Face symmetrical  Tongue twisters good    Upper extremities  No drift  No tremor  Normal rapid alternating movements    Lower extremities  Distal proximal strength good    Reflexes  Triceps 2+/2+  Biceps 2+/2+  Santiago reflex negative  Knees 2+/2+  Ankles absent  Toes downgoing    Sensory exam  Decreased vibratory sense in the feet compared to the hands  Temperature  appreciation and pinprick normal in the upper and lower extremities  Light touch intact    Gait  Normal  Romberg negative    Neuro exam stable    Assessment/plan    1.  Persistent a.m. headache onset 2021 or so       Some history of snoring question sleep apnea type headache    2.  History of multiple concussions at least 4 by recall different events throughout his life       Possible postconcussive type headache secondary to multiple concussions       Some family history of migraine       Started on topiramate June 2022 low-dose 25 mg twice daily did help some       Topamax 50 mg twice daily (February 2023)       Topamax 25 mg twice daily increased (June 13, 2023)    3.  Asymmetric pupils right greater than left       Unsure if this is secondary to previous trauma or if could be related to aneurysm          MRA intracranial vessels 1/23/2023          1.  No high-grade stenosis or large vessel occlusion.          2.  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    4.   Mild small vessel ischemic changes on MRI scan        MRI scan brain 1/23/2023        1.  No acute intracranial process.        2.  Generalized brain atrophy and presumed microvascular ischemic.        History of hypertension/hyperlipidemia        Risk factor reduction per primary MD on blood pressure medication        Diagnosis  Persistent daily headache  Postconcussive disorder  Snoring question sleep apnea  Left supraclinoid infundibula  Multiple concussive/multi concussive syndrome        Current plan  1.   Started on topiramate June 2022 low-dose 25 mg twice daily did help some       Topamax 50 mg twice daily (February 2023)       Topamax 25 mg twice daily increased (June 13, 2023)    2.  Highly recommend formal sleep study to make sure there is not con founding medical condition that prevents improvement.       Formal home sleep study to be performed  6/13/2023 should follow sleep recommendations      3.  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    4.  Work restrictions/FMLA forms per patient's primary MD who has been filling these out in the past       Patient had a lot of questions about personal injury 's and their representation.       He should do what he needs to do to have proper representation I am currently seeing the patient greater than 2 years from the event.       And with the patient with multiple concussions in the past difficulty to know if all the symptoms would be from this 1 event.      Seems to be getting some benefit from the Topamax  We increase the dose slightly today on 6/13/2023 to 75 mg twice daily  We discussed side effects  We will stay well-hydrated  We will complete his sleep work-up    Follow-up in February 2024  Seems to be having some response to medication    Says that his  may call in regards to his work-related injury  Was seen after the fact has multiple concussions    32 minutes total care time today              Again, thank you for allowing me to participate in the care of your patient.        Sincerely,        Umberto Georges MD

## 2023-06-13 NOTE — NURSING NOTE
Oneil Fuller is a 63 year old male who presents for:  Chief Complaint   Patient presents with     Headache     Trouble sleeping  Currently has a headache since this morning  Took 2 500mg of tylenol, helped relieved a bit         Initial Vitals:  BP (!) 146/94   Pulse 77  Estimated body mass index is 34.58 kg/m  as calculated from the following:    Height as of 5/8/23: 1.829 m (6').    Weight as of 5/8/23: 115.7 kg (255 lb).. There is no height or weight on file to calculate BSA. BP completed using cuff size: wrist cuff    Magno Reese

## 2023-06-15 ENCOUNTER — OFFICE VISIT (OUTPATIENT)
Dept: SLEEP MEDICINE | Facility: CLINIC | Age: 63
End: 2023-06-15

## 2023-06-15 DIAGNOSIS — F41.1 ANXIETY STATE: ICD-10-CM

## 2023-06-15 DIAGNOSIS — I10 ESSENTIAL HYPERTENSION: ICD-10-CM

## 2023-06-15 DIAGNOSIS — E66.811 CLASS 1 OBESITY DUE TO EXCESS CALORIES WITH SERIOUS COMORBIDITY AND BODY MASS INDEX (BMI) OF 34.0 TO 34.9 IN ADULT: ICD-10-CM

## 2023-06-15 DIAGNOSIS — R06.83 SNORING: ICD-10-CM

## 2023-06-15 DIAGNOSIS — R40.0 DAYTIME SLEEPINESS: ICD-10-CM

## 2023-06-15 DIAGNOSIS — R51.9 MORNING HEADACHE: ICD-10-CM

## 2023-06-15 DIAGNOSIS — E66.09 CLASS 1 OBESITY DUE TO EXCESS CALORIES WITH SERIOUS COMORBIDITY AND BODY MASS INDEX (BMI) OF 34.0 TO 34.9 IN ADULT: ICD-10-CM

## 2023-06-15 NOTE — PROGRESS NOTES
Pt is completing a home sleep test. Pt was instructed on how to put on the Noxturnal T3 device and associated equipment before going to bed and given the opportunity to practice putting it on before leaving the sleep center. Pt was reminded to bring the home sleep test kit back to the center tomorrow, at agreed upon time for download and reporting.   Neck circumference: 44 CM / 17 1/2 inches.

## 2023-06-16 ENCOUNTER — DOCUMENTATION ONLY (OUTPATIENT)
Dept: SLEEP MEDICINE | Facility: CLINIC | Age: 63
End: 2023-06-16

## 2023-06-16 NOTE — PROGRESS NOTES
The HST was returned but did not record properly due to inadequate amount of usable study time - less than 4 hours of usable pulse ox signal.  Patient will need to be called to the repeat test. Staff was notified test is a redo and charges were deleted.

## 2023-06-16 NOTE — PROGRESS NOTES
HST POST-STUDY QUESTIONNAIRE    1. What time did you go to bed?  12 a.m.  2. How long do you think it took to fall asleep?  30 min  3. What time did you wake up to start the day?  8 a.m.  4. Did you get up during the night at all?  1   5. If you woke up, do you remember approximately what time(s)? 4 a.m.  6. Did you have any difficulty with the equipment?  No  7. Did you us any type of treatment with this study?  None  8. Was the head of the bed elevated? Yes  9. Did you sleep in a recliner?  No  10. Did you stop using CPAP at least 3 days before this test?  NA  11. Any other information you'd like us to know? No

## 2023-06-27 ENCOUNTER — TELEPHONE (OUTPATIENT)
Dept: DERMATOLOGY | Facility: CLINIC | Age: 63
End: 2023-06-27

## 2023-06-27 ENCOUNTER — OFFICE VISIT (OUTPATIENT)
Dept: SLEEP MEDICINE | Facility: CLINIC | Age: 63
End: 2023-06-27

## 2023-06-27 DIAGNOSIS — R06.83 SNORING: Primary | ICD-10-CM

## 2023-06-27 PROCEDURE — G0399 HOME SLEEP TEST/TYPE 3 PORTA: HCPCS | Performed by: INTERNAL MEDICINE

## 2023-06-27 ASSESSMENT — SLEEP AND FATIGUE QUESTIONNAIRES
HOW LIKELY ARE YOU TO NOD OFF OR FALL ASLEEP WHILE SITTING INACTIVE IN A PUBLIC PLACE: SLIGHT CHANCE OF DOZING
HOW LIKELY ARE YOU TO NOD OFF OR FALL ASLEEP WHILE WATCHING TV: SLIGHT CHANCE OF DOZING
HOW LIKELY ARE YOU TO NOD OFF OR FALL ASLEEP WHILE LYING DOWN TO REST IN THE AFTERNOON WHEN CIRCUMSTANCES PERMIT: SLIGHT CHANCE OF DOZING
HOW LIKELY ARE YOU TO NOD OFF OR FALL ASLEEP IN A CAR, WHILE STOPPED FOR A FEW MINUTES IN TRAFFIC: WOULD NEVER DOZE
HOW LIKELY ARE YOU TO NOD OFF OR FALL ASLEEP WHILE SITTING AND TALKING TO SOMEONE: WOULD NEVER DOZE
HOW LIKELY ARE YOU TO NOD OFF OR FALL ASLEEP WHILE SITTING QUIETLY AFTER LUNCH WITHOUT ALCOHOL: WOULD NEVER DOZE
HOW LIKELY ARE YOU TO NOD OFF OR FALL ASLEEP WHEN YOU ARE A PASSENGER IN A CAR FOR AN HOUR WITHOUT A BREAK: SLIGHT CHANCE OF DOZING
HOW LIKELY ARE YOU TO NOD OFF OR FALL ASLEEP WHILE SITTING AND READING: SLIGHT CHANCE OF DOZING

## 2023-06-27 NOTE — TELEPHONE ENCOUNTER
Via phone [employer] is aware the following:has been cancelled. Patient will call to rescheduled.    Appointment type: Return  Provider:   Rescheduled date: 12-3-23  Specialty phone number: [phone]

## 2023-06-28 ENCOUNTER — TELEPHONE (OUTPATIENT)
Dept: SLEEP MEDICINE | Facility: CLINIC | Age: 63
End: 2023-06-28

## 2023-06-28 ENCOUNTER — DOCUMENTATION ONLY (OUTPATIENT)
Dept: SLEEP MEDICINE | Facility: CLINIC | Age: 63
End: 2023-06-28

## 2023-06-28 NOTE — NURSING NOTE
Pt returned HST device. It was downloaded and forwarded data to the clinical specialist for scoring.  Nely Pimentel CMA on 6/28/2023 at 10:22 AM

## 2023-06-28 NOTE — PROGRESS NOTES
This HSAT was performed using a Noxturnal T3 device which recorded snore, sound, movement activity, body position, nasal pressure, oronasal thermal airflow, pulse, oximetry and both chest and abdominal respiratory effort. HSAT data was restricted to the time patient states they were in bed.     HSAT was scored using 1B 4% hypopnea rule.     HST AHI (Non-PAT): 20.6  Snoring was reported as mild.  Time with SpO2 below 89% was 8.5 minutes.   Overall signal quality was good.     Pt will follow up with sleep provider to determine appropriate therapy.

## 2023-06-28 NOTE — TELEPHONE ENCOUNTER
Patient has returned device, closing encounter.     Frances SHAW RN  [facility]

## 2023-06-28 NOTE — TELEPHONE ENCOUNTER
Reason for Call:  Other call back    Detailed comments: patient called and overslept for appointment at  SLEEP CENTER this am for HST drop off.    Wants to know if can drop off at 10 AM this morning.    Please contact patient.  Thank you.    Phone Number Patient can be reached at: Home number on file [phone] (home)    Best Time: any    Can we leave a detailed message on this number? YES    Call taken on 6/28/2023 at 9:03 AM by Alana Jamison

## 2023-06-28 NOTE — PROGRESS NOTES
HST POST-STUDY QUESTIONNAIRE    1. What time did you go to bed?  9:30pm  2. How long do you think it took to fall asleep?  11:30am  3. What time did you wake up to start the day?  8:30am  4. Did you get up during the night at all?  Yes once  5. If you woke up, do you remember approximately what time(s)?   6. Did you have any difficulty with the equipment?  No  7. Did you us any type of treatment with this study?  None  8. Was the head of the bed elevated? Yes  9. Did you sleep in a recliner?  No  10. Did you stop using CPAP at least 3 days before this test?  NA  11. Any other information you'd like us to know?

## 2023-06-29 NOTE — PROCEDURES
HOME SLEEP STUDY INTERPRETATION        Patient: Oneil Fuller  MRN: [id number]  YOB: 1960  Study Date: 2023  PCP/Referring Provider: Oneil Stacy;   Ordering Provider: Lise Amezcua PA-C         Indications for Home Study: Oneil Fuller is a 63 year old male  who presents with symptoms suggestive of obstructive sleep apnea.    Estimated body mass index is 34.58 kg/m  as calculated from the following:    Height as of 23: 1.829 m (6').    Weight as of 23: 115.7 kg (255 lb).  Total score - [place]: 5 (2023  9:22 AM)  STOP-BAN/8        Data: A full night home sleep study was performed recording the standard physiologic parameters including body position, movement, sound, nasal pressure, thermal oral airflow, chest and abdominal movements with respiratory inductance plethysmography, and oxygen saturation by pulse oximetry. Pulse rate was estimated by oximetry recording. This study was considered adequate based on > 4 hours of quality oximetry and respiratory recording. As specified by the AASM Manual for the Scoring of Sleep and Associated events, version 2.3, Rule VIII.D 1B, 4% oxygen desaturation scoring for hypopneas is used as a standard of care on all home sleep apnea testing.        Analysis Time:  548 minutes        Respiration:   Sleep Associated Hypoxemia: sustained hypoxemia was present. Baseline oxygen saturation was 98%.  Time with saturation less than or equal to 88% was 8.5 minutes. The lowest oxygen saturation was 81%.   Snoring: Snoring was present.  Respiratory events: The home study revealed a presence of 126 obstructive apneas and 3 mixed and central apneas. There were 59 hypopneas resulting in a combined apnea/hypopnea index [AHI] of 20.6 events per hour.  AHI was 40.4 per hour supine, N/A per hour prone, 0 per hour on left side, and 2.3 per hour on right side.   Pattern: Excluding events noted above, respiratory rate and pattern was  Normal.      Position: Percent of time spent: supine - 49.9%, prone - 0%, on left - 31%, on right - 19.1%.      Heart Rate: By pulse oximetry normal rate was noted.       Assessment:     Moderate obstructive sleep apnea.    Sleep apnea events were almost exclusively supine position related.    Sleep associated hypoxemia was present.    Recommendations:    CPAP therapy is the treatment of choice for moderate obstructive sleep apnea.  Treatment can be initiated with auto titrating CPAP therapy with a pressure range of 5 to 15 cm H2O.  Patient should have appropriate clinical follow-up in 1 to 2 months after starting treatment to monitor response, compliance and CPAP download data.    If CPAP is not accepted or tolerated, patient can consider oral appliance therapy through referral to dental sleep medicine.    Positional therapy to avoid supine sleep can be a consideration.    If devices are not acceptable, patient can consider evaluation for upper airway surgery through specialized ENT surgery.    Suggest optimizing sleep hygiene and avoiding sleep deprivation.    Weight management.        Diagnosis Code(s): Obstructive Sleep Apnea G47.33, Hypoxemia G47.36    Sundar Ambriz MD, June 29, 2023   Diplomate, American Board of Psychiatry and Neurology, Sleep Medicine

## 2023-07-05 NOTE — NURSING NOTE
I talked the patient regarding monitor results.  Frequent PVCs, brief V. tach present along with second-degree AV block type I.  I recommended he stay off beta-blocker therapy avoid stimulants and proceed with EP consult and echo.      Please arrange for both and let me know when is the EP consult.   HST pick-up, HST drop-off, and follow-up appointment with provider have all been scheduled.  Lo Edmond, CMA

## 2023-07-07 ENCOUNTER — MYC MEDICAL ADVICE (OUTPATIENT)
Dept: FAMILY MEDICINE | Facility: CLINIC | Age: 63
End: 2023-07-07

## 2023-07-24 NOTE — TELEPHONE ENCOUNTER
Patient Quality Outreach    Patient is due for the following:   Hypertension -  Hypertension follow-up visit    Next Steps:   Patient has upcoming appointment, these items will be addressed at that time.    Type of outreach:    Sent [email or web address] message.      Questions for provider review:    None           Liana Peacock RN

## 2023-08-06 DIAGNOSIS — F41.1 ANXIETY STATE: ICD-10-CM

## 2023-08-06 NOTE — TELEPHONE ENCOUNTER
"Routing refill request to provider for review/approval because:  A break in medication    Last Written Prescription Date:  5/31/22  Last Fill Quantity: 270,  # refills: 3   Last office visit provider:   4/4/23    Requested Prescriptions   Pending Prescriptions Disp Refills    PARoxetine (PAXIL) 20 MG tablet [Pharmacy Med Name: PAROXETINE 20MG TABLETS] 270 tablet 3     Sig: TAKE 3 TABLETS BY MOUTH DAILY       SSRIs Protocol Passed - 8/6/2023  5:29 AM        Passed - Recent (12 mo) or future (30 days) visit within the authorizing provider's specialty     Patient has had an office visit with the authorizing provider or a provider within the authorizing providers department within the previous 12 mos or has a future within next 30 days. See \"Patient Info\" tab in inbasket, or \"Choose Columns\" in Meds & Orders section of the refill encounter.              Passed - Medication is active on med list        Passed - Patient is age 18 or older             Liana Pradhan RN 08/06/23 6:20 PM  "

## 2023-08-07 DIAGNOSIS — I10 ESSENTIAL HYPERTENSION: ICD-10-CM

## 2023-08-07 RX ORDER — PAROXETINE 20 MG/1
TABLET, FILM COATED ORAL
Qty: 270 TABLET | Refills: 1 | Status: SHIPPED | OUTPATIENT
Start: 2023-08-07

## 2023-08-07 RX ORDER — LOSARTAN POTASSIUM 50 MG/1
50 TABLET ORAL DAILY
Qty: 90 TABLET | Refills: 3 | Status: SHIPPED | OUTPATIENT
Start: 2023-08-07

## 2023-08-07 NOTE — TELEPHONE ENCOUNTER
"Routing refill request to provider for review/approval because:  Labs out of range:  CR  BP out of range at last visit    Last Written Prescription Date:  5/31/22  Last Fill Quantity: 90,  # refills: 3   Last office visit provider:  4/4/23     Requested Prescriptions   Pending Prescriptions Disp Refills    losartan (COZAAR) 50 MG tablet [Pharmacy Med Name: LOSARTAN 50MG TABLETS] 90 tablet 3     Sig: TAKE 1 TABLET(50 MG) BY MOUTH DAILY       Angiotensin-II Receptors Failed - 8/7/2023 10:22 AM        Failed - Last blood pressure under 140/90 in past 12 months     BP Readings from Last 3 Encounters:   06/13/23 (!) 146/94   04/04/23 124/82   02/08/23 120/72                 Failed - Normal serum creatinine on file in past 12 months     Recent Labs   Lab Test 01/03/23  1541   CR 1.25*       Ok to refill medication if creatinine is low          Passed - Recent (12 mo) or future (30 days) visit within the authorizing provider's specialty     Patient has had an office visit with the authorizing provider or a provider within the authorizing providers department within the previous 12 mos or has a future within next 30 days. See \"Patient Info\" tab in inbasket, or \"Choose Columns\" in Meds & Orders section of the refill encounter.              Passed - Medication is active on med list        Passed - Patient is age 18 or older        Passed - Normal serum potassium on file in past 12 months     Recent Labs   Lab Test 01/03/23  1541   POTASSIUM 4.2                         Geneva Zepeda RN 08/07/23 10:22 AM  "

## 2023-09-30 ENCOUNTER — HEALTH MAINTENANCE LETTER (OUTPATIENT)
Age: 63
End: 2023-09-30

## 2024-02-17 ENCOUNTER — HEALTH MAINTENANCE LETTER (OUTPATIENT)
Age: 64
End: 2024-02-17

## 2024-07-06 ENCOUNTER — HEALTH MAINTENANCE LETTER (OUTPATIENT)
Age: 64
End: 2024-07-06

## 2024-08-29 ENCOUNTER — MYC MEDICAL ADVICE (OUTPATIENT)
Dept: FAMILY MEDICINE | Facility: CLINIC | Age: 64
End: 2024-08-29

## 2024-08-29 NOTE — TELEPHONE ENCOUNTER
Patient Quality Outreach    Patient is due for the following:   Hypertension -  BP check    Next Steps:   Patient needs to check their blood pressure and send blood pressure readings into the clinic for a provider to review.    Type of outreach:    Sent [employer] message.      Questions for provider review:    None           Ronit Jackson RN

## 2024-11-23 ENCOUNTER — HEALTH MAINTENANCE LETTER (OUTPATIENT)
Age: 64
End: 2024-11-23

## 2025-02-28 ENCOUNTER — TELEPHONE (OUTPATIENT)
Dept: FAMILY MEDICINE | Facility: CLINIC | Age: 65
End: 2025-02-28

## 2025-02-28 NOTE — TELEPHONE ENCOUNTER
General Call      Reason for Call:  Hold orders    What are your questions or concerns:  Please hold Plavix for 7 days prior to an EGD scheduled on 04/11/25. Hold to start on 04/04/25.     Date of last appointment with provider: 04/04/23    Could we send this information to you in [employer] or would you prefer to receive a phone call?:   Patient would prefer a phone call   Okay to leave a detailed message?: Yes at Other phone number:  [phone]

## 2025-03-06 NOTE — TELEPHONE ENCOUNTER
Called and spoke with Nursing Staff at [facility] and relayed the message from Dr. Stacy and the anticoagulation clinic, [facility] verbalized understanding and will reach out to the Patients cardiology clinic and [facility] once they have the hold orders.     Lonny Escobedo RN  [facility]

## 2025-03-08 ENCOUNTER — HEALTH MAINTENANCE LETTER (OUTPATIENT)
Age: 65
End: 2025-03-08

## 2025-06-22 ENCOUNTER — HEALTH MAINTENANCE LETTER (OUTPATIENT)
Age: 65
End: 2025-06-22